# Patient Record
Sex: MALE | Race: WHITE
[De-identification: names, ages, dates, MRNs, and addresses within clinical notes are randomized per-mention and may not be internally consistent; named-entity substitution may affect disease eponyms.]

---

## 2017-07-05 ENCOUNTER — MYAURORA ACCOUNT LINK (OUTPATIENT)
Dept: OTHER | Age: 57
End: 2017-07-05

## 2017-07-05 ENCOUNTER — PRIOR ORIGINAL RECORDS (OUTPATIENT)
Dept: OTHER | Age: 57
End: 2017-07-05

## 2017-11-18 ENCOUNTER — APPOINTMENT (OUTPATIENT)
Dept: CV DIAGNOSTICS | Facility: HOSPITAL | Age: 57
DRG: 234 | End: 2017-11-18
Attending: INTERNAL MEDICINE
Payer: COMMERCIAL

## 2017-11-18 ENCOUNTER — APPOINTMENT (OUTPATIENT)
Dept: GENERAL RADIOLOGY | Facility: HOSPITAL | Age: 57
DRG: 234 | End: 2017-11-18
Attending: THORACIC SURGERY (CARDIOTHORACIC VASCULAR SURGERY)
Payer: COMMERCIAL

## 2017-11-18 ENCOUNTER — ANESTHESIA (OUTPATIENT)
Dept: CARDIAC SURGERY | Facility: HOSPITAL | Age: 57
End: 2017-11-18

## 2017-11-18 ENCOUNTER — ANESTHESIA EVENT (OUTPATIENT)
Dept: CARDIAC SURGERY | Facility: HOSPITAL | Age: 57
End: 2017-11-18

## 2017-11-18 ENCOUNTER — HOSPITAL ENCOUNTER (INPATIENT)
Facility: HOSPITAL | Age: 57
LOS: 4 days | Discharge: HOME HEALTH | DRG: 234 | End: 2017-11-22
Attending: HOSPITALIST | Admitting: HOSPITALIST
Payer: COMMERCIAL

## 2017-11-18 ENCOUNTER — SURGERY (OUTPATIENT)
Age: 57
End: 2017-11-18

## 2017-11-18 ENCOUNTER — PRIOR ORIGINAL RECORDS (OUTPATIENT)
Dept: OTHER | Age: 57
End: 2017-11-18

## 2017-11-18 ENCOUNTER — APPOINTMENT (OUTPATIENT)
Dept: INTERVENTIONAL RADIOLOGY/VASCULAR | Facility: HOSPITAL | Age: 57
DRG: 234 | End: 2017-11-18
Attending: INTERNAL MEDICINE
Payer: COMMERCIAL

## 2017-11-18 PROBLEM — I25.10 CAD IN NATIVE ARTERY: Status: ACTIVE | Noted: 2017-11-18

## 2017-11-18 PROBLEM — Z95.1 S/P CABG (CORONARY ARTERY BYPASS GRAFT): Status: ACTIVE | Noted: 2017-11-18

## 2017-11-18 PROCEDURE — B24BZZ4 ULTRASONOGRAPHY OF HEART WITH AORTA, TRANSESOPHAGEAL: ICD-10-PCS | Performed by: ANESTHESIOLOGY

## 2017-11-18 PROCEDURE — 5A02210 ASSISTANCE WITH CARDIAC OUTPUT USING BALLOON PUMP, CONTINUOUS: ICD-10-PCS | Performed by: INTERNAL MEDICINE

## 2017-11-18 PROCEDURE — 4A023N7 MEASUREMENT OF CARDIAC SAMPLING AND PRESSURE, LEFT HEART, PERCUTANEOUS APPROACH: ICD-10-PCS | Performed by: INTERNAL MEDICINE

## 2017-11-18 PROCEDURE — 06BQ4ZZ EXCISION OF LEFT SAPHENOUS VEIN, PERCUTANEOUS ENDOSCOPIC APPROACH: ICD-10-PCS | Performed by: THORACIC SURGERY (CARDIOTHORACIC VASCULAR SURGERY)

## 2017-11-18 PROCEDURE — 30233R1 TRANSFUSION OF NONAUTOLOGOUS PLATELETS INTO PERIPHERAL VEIN, PERCUTANEOUS APPROACH: ICD-10-PCS | Performed by: THORACIC SURGERY (CARDIOTHORACIC VASCULAR SURGERY)

## 2017-11-18 PROCEDURE — 021109W BYPASS CORONARY ARTERY, TWO ARTERIES FROM AORTA WITH AUTOLOGOUS VENOUS TISSUE, OPEN APPROACH: ICD-10-PCS | Performed by: THORACIC SURGERY (CARDIOTHORACIC VASCULAR SURGERY)

## 2017-11-18 PROCEDURE — 02100Z9 BYPASS CORONARY ARTERY, ONE ARTERY FROM LEFT INTERNAL MAMMARY, OPEN APPROACH: ICD-10-PCS | Performed by: THORACIC SURGERY (CARDIOTHORACIC VASCULAR SURGERY)

## 2017-11-18 PROCEDURE — 93306 TTE W/DOPPLER COMPLETE: CPT | Performed by: INTERNAL MEDICINE

## 2017-11-18 PROCEDURE — B211YZZ FLUOROSCOPY OF MULTIPLE CORONARY ARTERIES USING OTHER CONTRAST: ICD-10-PCS | Performed by: INTERNAL MEDICINE

## 2017-11-18 PROCEDURE — 30233K1 TRANSFUSION OF NONAUTOLOGOUS FROZEN PLASMA INTO PERIPHERAL VEIN, PERCUTANEOUS APPROACH: ICD-10-PCS | Performed by: THORACIC SURGERY (CARDIOTHORACIC VASCULAR SURGERY)

## 2017-11-18 PROCEDURE — 5A1221Z PERFORMANCE OF CARDIAC OUTPUT, CONTINUOUS: ICD-10-PCS | Performed by: THORACIC SURGERY (CARDIOTHORACIC VASCULAR SURGERY)

## 2017-11-18 PROCEDURE — B215YZZ FLUOROSCOPY OF LEFT HEART USING OTHER CONTRAST: ICD-10-PCS | Performed by: INTERNAL MEDICINE

## 2017-11-18 PROCEDURE — 99223 1ST HOSP IP/OBS HIGH 75: CPT | Performed by: HOSPITALIST

## 2017-11-18 PROCEDURE — 71010 XR CHEST AP PORTABLE  (CPT=71010): CPT | Performed by: THORACIC SURGERY (CARDIOTHORACIC VASCULAR SURGERY)

## 2017-11-18 RX ORDER — MORPHINE SULFATE 4 MG/ML
INJECTION, SOLUTION INTRAMUSCULAR; INTRAVENOUS
Status: COMPLETED
Start: 2017-11-18 | End: 2017-11-18

## 2017-11-18 RX ORDER — DOBUTAMINE HYDROCHLORIDE 200 MG/100ML
INJECTION INTRAVENOUS CONTINUOUS PRN
Status: DISCONTINUED | OUTPATIENT
Start: 2017-11-18 | End: 2017-11-20

## 2017-11-18 RX ORDER — ACETAMINOPHEN 325 MG/1
650 TABLET ORAL EVERY 6 HOURS PRN
Status: DISCONTINUED | OUTPATIENT
Start: 2017-11-18 | End: 2017-11-18

## 2017-11-18 RX ORDER — ASPIRIN 300 MG
300 SUPPOSITORY, RECTAL RECTAL ONCE
Status: DISCONTINUED | OUTPATIENT
Start: 2017-11-18 | End: 2017-11-20

## 2017-11-18 RX ORDER — ONDANSETRON 2 MG/ML
4 INJECTION INTRAMUSCULAR; INTRAVENOUS EVERY 6 HOURS PRN
Status: DISCONTINUED | OUTPATIENT
Start: 2017-11-18 | End: 2017-11-22

## 2017-11-18 RX ORDER — TEMAZEPAM 15 MG/1
15 CAPSULE ORAL NIGHTLY PRN
Status: DISCONTINUED | OUTPATIENT
Start: 2017-11-18 | End: 2017-11-20

## 2017-11-18 RX ORDER — MIDAZOLAM HYDROCHLORIDE 1 MG/ML
1 INJECTION INTRAMUSCULAR; INTRAVENOUS EVERY 30 MIN PRN
Status: DISCONTINUED | OUTPATIENT
Start: 2017-11-18 | End: 2017-11-20

## 2017-11-18 RX ORDER — SODIUM CHLORIDE 9 MG/ML
INJECTION, SOLUTION INTRAVENOUS CONTINUOUS
Status: ACTIVE | OUTPATIENT
Start: 2017-11-18 | End: 2017-11-18

## 2017-11-18 RX ORDER — POLYETHYLENE GLYCOL 3350 17 G/17G
1 POWDER, FOR SOLUTION ORAL DAILY PRN
Status: DISCONTINUED | OUTPATIENT
Start: 2017-11-18 | End: 2017-11-22

## 2017-11-18 RX ORDER — ASPIRIN 325 MG
325 TABLET ORAL ONCE
Status: DISCONTINUED | OUTPATIENT
Start: 2017-11-18 | End: 2017-11-20

## 2017-11-18 RX ORDER — ASPIRIN 81 MG/1
324 TABLET, CHEWABLE ORAL ONCE
Status: DISCONTINUED | OUTPATIENT
Start: 2017-11-18 | End: 2017-11-18 | Stop reason: HOSPADM

## 2017-11-18 RX ORDER — ASPIRIN 325 MG
325 TABLET, DELAYED RELEASE (ENTERIC COATED) ORAL DAILY
Status: DISCONTINUED | OUTPATIENT
Start: 2017-11-19 | End: 2017-11-22

## 2017-11-18 RX ORDER — MIDAZOLAM HYDROCHLORIDE 1 MG/ML
INJECTION INTRAMUSCULAR; INTRAVENOUS
Status: COMPLETED
Start: 2017-11-18 | End: 2017-11-18

## 2017-11-18 RX ORDER — SODIUM CHLORIDE 9 MG/ML
INJECTION, SOLUTION INTRAVENOUS ONCE
Status: DISCONTINUED | OUTPATIENT
Start: 2017-11-18 | End: 2017-11-18

## 2017-11-18 RX ORDER — DEXTROSE AND SODIUM CHLORIDE 5; .45 G/100ML; G/100ML
INJECTION, SOLUTION INTRAVENOUS CONTINUOUS
Status: ACTIVE | OUTPATIENT
Start: 2017-11-18 | End: 2017-11-19

## 2017-11-18 RX ORDER — MORPHINE SULFATE 4 MG/ML
2 INJECTION, SOLUTION INTRAMUSCULAR; INTRAVENOUS
Status: DISCONTINUED | OUTPATIENT
Start: 2017-11-18 | End: 2017-11-22

## 2017-11-18 RX ORDER — NITROGLYCERIN 20 MG/100ML
INJECTION INTRAVENOUS CONTINUOUS
Status: DISCONTINUED | OUTPATIENT
Start: 2017-11-18 | End: 2017-11-20

## 2017-11-18 RX ORDER — MORPHINE SULFATE 4 MG/ML
8 INJECTION, SOLUTION INTRAMUSCULAR; INTRAVENOUS
Status: DISCONTINUED | OUTPATIENT
Start: 2017-11-18 | End: 2017-11-20

## 2017-11-18 RX ORDER — DOCUSATE SODIUM 100 MG/1
100 CAPSULE, LIQUID FILLED ORAL 2 TIMES DAILY
Status: DISCONTINUED | OUTPATIENT
Start: 2017-11-18 | End: 2017-11-22

## 2017-11-18 RX ORDER — HYDROCODONE BITARTRATE AND ACETAMINOPHEN 10; 325 MG/1; MG/1
2 TABLET ORAL EVERY 4 HOURS PRN
Status: DISCONTINUED | OUTPATIENT
Start: 2017-11-18 | End: 2017-11-20

## 2017-11-18 RX ORDER — ATORVASTATIN CALCIUM 10 MG/1
10 TABLET, FILM COATED ORAL NIGHTLY
Status: DISCONTINUED | OUTPATIENT
Start: 2017-11-18 | End: 2017-11-18

## 2017-11-18 RX ORDER — DEXTROSE AND SODIUM CHLORIDE 5; .45 G/100ML; G/100ML
INJECTION, SOLUTION INTRAVENOUS CONTINUOUS
Status: ACTIVE | OUTPATIENT
Start: 2017-11-18 | End: 2017-11-18

## 2017-11-18 RX ORDER — POTASSIUM CHLORIDE 14.9 MG/ML
20 INJECTION INTRAVENOUS AS NEEDED
Status: DISCONTINUED | OUTPATIENT
Start: 2017-11-18 | End: 2017-11-20

## 2017-11-18 RX ORDER — FAMOTIDINE 10 MG/ML
20 INJECTION, SOLUTION INTRAVENOUS 2 TIMES DAILY
Status: DISCONTINUED | OUTPATIENT
Start: 2017-11-18 | End: 2017-11-20

## 2017-11-18 RX ORDER — CHLORHEXIDINE GLUCONATE 0.12 MG/ML
15 RINSE ORAL
Status: DISCONTINUED | OUTPATIENT
Start: 2017-11-18 | End: 2017-11-19

## 2017-11-18 RX ORDER — ASPIRIN 300 MG
300 SUPPOSITORY, RECTAL RECTAL DAILY
Status: DISCONTINUED | OUTPATIENT
Start: 2017-11-19 | End: 2017-11-20

## 2017-11-18 RX ORDER — IPRATROPIUM BROMIDE AND ALBUTEROL SULFATE 2.5; .5 MG/3ML; MG/3ML
3 SOLUTION RESPIRATORY (INHALATION) EVERY 4 HOURS
Status: DISCONTINUED | OUTPATIENT
Start: 2017-11-18 | End: 2017-11-19

## 2017-11-18 RX ORDER — LIDOCAINE HYDROCHLORIDE 10 MG/ML
INJECTION, SOLUTION INFILTRATION; PERINEURAL
Status: COMPLETED
Start: 2017-11-18 | End: 2017-11-18

## 2017-11-18 RX ORDER — ASPIRIN 325 MG
325 TABLET ORAL DAILY
Status: DISCONTINUED | OUTPATIENT
Start: 2017-11-18 | End: 2017-11-20

## 2017-11-18 RX ORDER — ATORVASTATIN CALCIUM 40 MG/1
40 TABLET, FILM COATED ORAL DAILY
Status: DISCONTINUED | OUTPATIENT
Start: 2017-11-18 | End: 2017-11-22

## 2017-11-18 RX ORDER — HEPARIN SODIUM AND DEXTROSE 10000; 5 [USP'U]/100ML; G/100ML
1000 INJECTION INTRAVENOUS ONCE
Status: COMPLETED | OUTPATIENT
Start: 2017-11-18 | End: 2017-11-18

## 2017-11-18 RX ORDER — ONDANSETRON 2 MG/ML
4 INJECTION INTRAMUSCULAR; INTRAVENOUS EVERY 6 HOURS PRN
Status: DISCONTINUED | OUTPATIENT
Start: 2017-11-18 | End: 2017-11-20

## 2017-11-18 RX ORDER — FAMOTIDINE 20 MG/1
20 TABLET ORAL 2 TIMES DAILY
Status: DISCONTINUED | OUTPATIENT
Start: 2017-11-18 | End: 2017-11-22

## 2017-11-18 RX ORDER — HYDROCODONE BITARTRATE AND ACETAMINOPHEN 10; 325 MG/1; MG/1
1 TABLET ORAL EVERY 4 HOURS PRN
Status: DISCONTINUED | OUTPATIENT
Start: 2017-11-18 | End: 2017-11-20

## 2017-11-18 RX ORDER — METOPROLOL SUCCINATE 50 MG/1
50 TABLET, EXTENDED RELEASE ORAL
Status: DISCONTINUED | OUTPATIENT
Start: 2017-11-18 | End: 2017-11-19

## 2017-11-18 RX ORDER — TEMAZEPAM 15 MG/1
15 CAPSULE ORAL NIGHTLY PRN
Status: DISCONTINUED | OUTPATIENT
Start: 2017-11-18 | End: 2017-11-18

## 2017-11-18 RX ORDER — NITROGLYCERIN 20 MG/100ML
INJECTION INTRAVENOUS CONTINUOUS PRN
Status: DISCONTINUED | OUTPATIENT
Start: 2017-11-18 | End: 2017-11-20

## 2017-11-18 RX ORDER — HEPARIN SODIUM 5000 [USP'U]/ML
INJECTION, SOLUTION INTRAVENOUS; SUBCUTANEOUS
Status: COMPLETED
Start: 2017-11-18 | End: 2017-11-18

## 2017-11-18 RX ORDER — SODIUM CHLORIDE 9 MG/ML
INJECTION, SOLUTION INTRAVENOUS CONTINUOUS
Status: DISCONTINUED | OUTPATIENT
Start: 2017-11-18 | End: 2017-11-20

## 2017-11-18 RX ORDER — POTASSIUM CHLORIDE 29.8 MG/ML
40 INJECTION INTRAVENOUS AS NEEDED
Status: DISCONTINUED | OUTPATIENT
Start: 2017-11-18 | End: 2017-11-21

## 2017-11-18 RX ORDER — DEXTROSE MONOHYDRATE 25 G/50ML
50 INJECTION, SOLUTION INTRAVENOUS
Status: DISCONTINUED | OUTPATIENT
Start: 2017-11-18 | End: 2017-11-20

## 2017-11-18 RX ORDER — MORPHINE SULFATE 4 MG/ML
2 INJECTION, SOLUTION INTRAMUSCULAR; INTRAVENOUS
Status: DISCONTINUED | OUTPATIENT
Start: 2017-11-18 | End: 2017-11-18

## 2017-11-18 RX ORDER — ALBUMIN, HUMAN INJ 5% 5 %
250 SOLUTION INTRAVENOUS ONCE AS NEEDED
Status: COMPLETED | OUTPATIENT
Start: 2017-11-18 | End: 2017-11-18

## 2017-11-18 RX ORDER — MORPHINE SULFATE 4 MG/ML
4 INJECTION, SOLUTION INTRAMUSCULAR; INTRAVENOUS
Status: DISCONTINUED | OUTPATIENT
Start: 2017-11-18 | End: 2017-11-22

## 2017-11-18 RX ORDER — SODIUM CHLORIDE 9 MG/ML
INJECTION, SOLUTION INTRAVENOUS CONTINUOUS
Status: DISCONTINUED | OUTPATIENT
Start: 2017-11-18 | End: 2017-11-18

## 2017-11-18 RX ORDER — DEXMEDETOMIDINE HYDROCHLORIDE 4 UG/ML
INJECTION, SOLUTION INTRAVENOUS CONTINUOUS
Status: DISCONTINUED | OUTPATIENT
Start: 2017-11-18 | End: 2017-11-20

## 2017-11-18 RX ORDER — ACETAMINOPHEN 325 MG/1
650 TABLET ORAL EVERY 6 HOURS PRN
Status: DISCONTINUED | OUTPATIENT
Start: 2017-11-18 | End: 2017-11-20

## 2017-11-18 RX ORDER — CEFAZOLIN SODIUM 1 G/3ML
INJECTION, POWDER, FOR SOLUTION INTRAMUSCULAR; INTRAVENOUS
Status: DISCONTINUED | OUTPATIENT
Start: 2017-11-18 | End: 2017-11-20

## 2017-11-18 RX ORDER — CEFAZOLIN SODIUM 1 G/3ML
INJECTION, POWDER, FOR SOLUTION INTRAMUSCULAR; INTRAVENOUS
Status: DISCONTINUED | OUTPATIENT
Start: 2017-11-18 | End: 2017-11-18 | Stop reason: HOSPADM

## 2017-11-18 RX ORDER — LISINOPRIL 5 MG/1
5 TABLET ORAL DAILY
Status: DISCONTINUED | OUTPATIENT
Start: 2017-11-18 | End: 2017-11-21

## 2017-11-18 RX ORDER — BISACODYL 10 MG
10 SUPPOSITORY, RECTAL RECTAL
Status: DISCONTINUED | OUTPATIENT
Start: 2017-11-18 | End: 2017-11-22

## 2017-11-18 RX ORDER — HEPARIN SODIUM AND DEXTROSE 10000; 5 [USP'U]/100ML; G/100ML
INJECTION INTRAVENOUS CONTINUOUS
Status: DISCONTINUED | OUTPATIENT
Start: 2017-11-18 | End: 2017-11-20

## 2017-11-18 NOTE — HISTORICAL OFFICE NOTE
Nguyen Perry  : 1960  ACCOUNT:  [id number]  [phone]  PCP: Dr. Max Resendiz     TODAY'S DATE: 2017  DICTATED BY:  Magdiel Roca M.D.]    CHIEF COMPLAINT: [Followup of .  CAD, of native vessels, Followup of Hyperlipidemia, Mixed, needs labs. I have given him a prescription for this. We talked about the importance of exercise therapy and diet. The patient understands this. If his labs are okay, I will see him back in a year.       PRESCRIPTIONS:   07/11/17 *Metoprolol Succinate 5

## 2017-11-18 NOTE — SIGNIFICANT EVENT
Received patient from [facility]. He is alert and oriented with heparin drip on. Needs attended.

## 2017-11-18 NOTE — ANESTHESIA PREPROCEDURE EVALUATION
PRE-OP EVALUATION    Patient Name: Rich Miles    Pre-op Diagnosis: chest pain s/p cardiac catheterization    Procedure(s):  Dr. Isabelle Benítez to notify PA & perfusionist. MD wants it at 0830  CV RNs/team and cardiac anesthesia notified by [facility]      Surgeon(s) a INITIAL DOSE 1,000 Units/hr Intravenous Once   heparin (PORCINE) drip 73599iejkm/250mL infusion CONTINUOUS 200-3,000 Units/hr Intravenous Continuous   Pantoprazole Sodium (PROTONIX) 40 mg in Sodium Chloride 0.9 % 10 mL IV push 40 mg Intravenous Q24H   nitr inserted  12/30/2013: COLONOSCOPY      Comment: Dr. Jolly Ghosh  4/22/16: HERNIA SURGERY      Comment: Laparoscopic bilateral inguinal hernia repair                with mesh and umbilical hernia repair  No date: IR STENT      Comment: cardiac  No date: PERIOD

## 2017-11-18 NOTE — CONSULTS
[facility]    Report of Consultation    Oneda Comp Patient Status:  Observation    1960 MRN [id number]   [facility] 2NE-A Attending Corby Perdue MD   [address] Day # 0 PCP Jamar Manzano DO     Date of Admission:  2017 His repeat EKG shows prior inferior infarct. Previously noted inverted T-wave is now upright. He denies shortness of breath. He does have some chronic shoulder problems. Stents were initially placed in the circumflex in 2002 and then the right in 2003. lisinopril (PRINIVIL,ZESTRIL) 10 MG Oral Tab Take 5 mg by mouth daily.          Allergies        No Known Allergies    Review of Systems:   Shoulder pain with chest pain and an episode of syncope otherwise  A comprehensive review of systems was negative e

## 2017-11-18 NOTE — ANESTHESIA POSTPROCEDURE EVALUATION
[facility] Patient Status:  Inpatient   Age/Gender 62year old male MRN [id number]   [facility] 6NE-A Attending Satnam Torres MD   [address] Day # 0 PCP Edis Car, DO       Anesthesia Post-op Note    Procedure(s):  c

## 2017-11-18 NOTE — H&P
[facility]  History and Physical     Golden Lam Patient Status:  Inpatient    1960 MRN [id number]   [facility] 6NE-A Attending Hebert Hines MD   [facility] Day # 0 PCP Jono Gates DO     Chief Complaint: chest pain Tab Take 1 tablet by mouth every 6 (six) hours as needed for Pain. Disp: 30 tablet Rfl: 0   HYDROcodone-acetaminophen (NORCO) 5-325 MG Oral Tab Take 1 tablet by mouth every 6 (six) hours as needed for Pain.  Disp: 30 tablet Rfl: 0   aspirin 325 MG Oral Tab (Patient's most recent lab result is older than the maximum 7 days allowed. ). No results for input(s): PTP, INR in the last 72 hours. No results for input(s): TROP, CK in the last 72 hours. Imaging: Imaging data reviewed in Epic.       ASSESSMENT /

## 2017-11-18 NOTE — PROGRESS NOTES
Cardiology Cardiac Cath Note        11/18/2017   4:15 AM          Procedure Performed: LHC, COR, LV and IABP          Indication: NSTEMI & CCS 4 angina          Cardiologist: Kalpesh Feliciano.  Darío Fairchild MD, [facility]          Conscious Sedation Given:  yes     see consc

## 2017-11-18 NOTE — CONSULTS
[facility] Consult    Dr Julio C Weinstein  PCP: [place]     63 yo WM with h/o CAD taken to [facility] due to syncopal episode while at his [employer] play  Troponin slightly elevated, pt also had CP and diaphoresis  Transferred to [facility] for further care     Cardiac cath [facility]

## 2017-11-18 NOTE — PROGRESS NOTES
CV Surgery Progress Note  Pt seen and examined by Dr. Alejandra Gonzalez. Cath films reviewed. Pt is NPO. Plan for CABG this am with Dr Ankur Ruvalcaba.    D/W Dr Yoav Gipson RN

## 2017-11-19 ENCOUNTER — APPOINTMENT (OUTPATIENT)
Dept: GENERAL RADIOLOGY | Facility: HOSPITAL | Age: 57
DRG: 234 | End: 2017-11-19
Attending: THORACIC SURGERY (CARDIOTHORACIC VASCULAR SURGERY)
Payer: COMMERCIAL

## 2017-11-19 PROCEDURE — 71010 XR CHEST AP PORTABLE  (CPT=71010): CPT | Performed by: THORACIC SURGERY (CARDIOTHORACIC VASCULAR SURGERY)

## 2017-11-19 PROCEDURE — 30233N1 TRANSFUSION OF NONAUTOLOGOUS RED BLOOD CELLS INTO PERIPHERAL VEIN, PERCUTANEOUS APPROACH: ICD-10-PCS | Performed by: HOSPITALIST

## 2017-11-19 PROCEDURE — 99232 SBSQ HOSP IP/OBS MODERATE 35: CPT | Performed by: HOSPITALIST

## 2017-11-19 RX ORDER — DEXTROSE MONOHYDRATE 25 G/50ML
50 INJECTION, SOLUTION INTRAVENOUS
Status: DISCONTINUED | OUTPATIENT
Start: 2017-11-19 | End: 2017-11-22

## 2017-11-19 RX ORDER — IPRATROPIUM BROMIDE AND ALBUTEROL SULFATE 2.5; .5 MG/3ML; MG/3ML
3 SOLUTION RESPIRATORY (INHALATION) EVERY 4 HOURS PRN
Status: DISCONTINUED | OUTPATIENT
Start: 2017-11-19 | End: 2017-11-22

## 2017-11-19 RX ORDER — FUROSEMIDE 10 MG/ML
20 INJECTION INTRAMUSCULAR; INTRAVENOUS
Status: DISPENSED | OUTPATIENT
Start: 2017-11-19 | End: 2017-11-22

## 2017-11-19 RX ORDER — ALBUMIN, HUMAN INJ 5% 5 %
250 SOLUTION INTRAVENOUS ONCE
Status: COMPLETED | OUTPATIENT
Start: 2017-11-19 | End: 2017-11-19

## 2017-11-19 NOTE — OCCUPATIONAL THERAPY NOTE
OCCUPATIONAL THERAPY EVALUATION - INPATIENT     Room Number: 0604/0090-F  Evaluation Date: 11/19/2017  Type of Evaluation: Initial  Presenting Problem: s/p CABG    Physician Order: IP Consult to Occupational Therapy  Reason for Therapy: ADL/IADL Dysfunctio his independence      OBJECTIVE  Precautions: Sternal;Cardiac  Fall Risk: High fall risk    WEIGHT BEARING RESTRICTION                   PAIN ASSESSMENT  Ratin  Location: chest  Management Techniques: Activity promotion; Body mechanics;Breathing techniq grooming such as brushing teeth?: A Little  -   Eating meals?: A Little    AM-PAC Score:  Score: 15  Approx Degree of Impairment: 56.46%  Standardized Score (AM-PAC Scale): 34.69  CMS Modifier (G-Code): CK    FUNCTIONAL TRANSFER ASSESSMENT  Supine to Sit : therapy during this acute care stay with increased rest home would be appropriate.  In this OT evaluation patient presents with the following performance deficits: standing balance, activity tolerance, attention to task, decreased knowledge of compensatory supervision    UE Exercise Program Goal  Patient will be supervision with bilateral AROM HEP (home exercise program).     Additional Goals:  Pt will stand at sink level to complete simple grooming tasks  Pt will verbalize 3/3 sternal precautions   Pt will v

## 2017-11-19 NOTE — PLAN OF CARE
Received from OR intubated sedated, accompanied by Dr. Lily Sepulveda. IABP 1:3 . Insulin dobutamine levophed precedex gtts infusing. Simpson Right radial and left femoral . Central line access in left groin. See flow sheet for ongoing assessments.  Chest tube managem

## 2017-11-19 NOTE — CONSULTS
[facility] Consult Note:  Medication List Evaluation for Delirium    Cata Muse is a 62year old male admitted 11/18/17 who has tested positive for delirium per RN evaluation.   Pharmacy has been consulted to evaluate his current medications for tho

## 2017-11-19 NOTE — PROCEDURES
[facility]    PATIENT'S NAME: Shilpi Stern   ATTENDING PHYSICIAN: Cristopher Dc M.D. OPERATING PHYSICIAN: Parris Agustin M.D.    PATIENT ACCOUNT#:   [de-identified]    LOCATION:  [address] [place] [address]  MEDICAL RECORD #:   [id number]       DATE OF BIRTH: proximal lesion. Ramus intermedius branch exhibits 80% discrete lesion. There is a distal stent segment that is patent with mild in-stent restenosis. d. Right coronary artery:  100% occluded with bridging collaterals present.   Faint flow is noted into Patient was transferred to a monitored unit for further management in the CCU. The surgical service will see the patient straightaway this morning. Further recommendations are dependent upon counseling and discussion.       Conscious sedation was provided

## 2017-11-19 NOTE — PLAN OF CARE
IABP removed with manual pressure to left groin CMS good to extremities Hemostasis maintained. . Plans to keep sedated overnight updated Dr. Meli Carrillo will get ABG change of  Shift.

## 2017-11-19 NOTE — PLAN OF CARE
See flow sheet for ongoing assessments chest tube continued frequent monitoring and frequent updates with Dr. Lilly Spurling. 1 Platelets 2 FFP. V/S stable making urine.

## 2017-11-19 NOTE — PROGRESS NOTES
[facility]  Progress Note     Francisco Nunez Patient Status:  Inpatient    1960 MRN [id number]   [facility] 6NE-A Attending Charmaine Vazquez MD   [facility] Day # 1 PCP Mackenzie Kate DO     Chief Complaint: Chest pain    S: Latoya Slim TID AC   • [START ON 11/20/2017] Insulin Aspart Pen  1-25 Units Subcutaneous Once   • furosemide  20 mg Intravenous BID (Diuretic)   • aspirin  325 mg Oral Daily   • atorvastatin  40 mg Oral Daily   • lisinopril  5 mg Oral Daily   • pantoprazole (PROTONIX)

## 2017-11-19 NOTE — PLAN OF CARE
Denies chest pain. CV surgery here to discuss care plan with family and patient for CABG today. See pre op orders. Obtained consent . To OR Accompanied by RN with monitor and nitro gtt. CV surgery binder given to family.

## 2017-11-19 NOTE — PHYSICAL THERAPY NOTE
PHYSICAL THERAPY EVALUATION - INPATIENT     Room Number: 6657/8517-K  Evaluation Date: 11/19/2017  Type of Evaluation: Initial  Physician Order: PT Eval and Treat    Presenting Problem: s/p cardiac cath and CABG x 3 11/18/17  Reason for Therapy: [facility] Sternal;Cardiac  Fall Risk: High fall risk    WEIGHT BEARING RESTRICTION                   PAIN ASSESSMENT  Ratin  Location: chest incisional site  Management Techniques: Activity promotion; Body mechanics;Breathing techniques;Repositioning    COGNITION A)  Distance (ft): 20  Assistive Device: Rolling walker  Pattern: Shuffle  Stoop/Curb Assistance: Not tested       Skilled Therapy Provided: Patient presents seated in bedside chair. Pt completes sit<>stand c Min A and cues for safe hand/foot placement.  Pt mobility. Based on this evaluation, patient's clinical presentation is evolving and overall the evaluation complexity is considered moderate.   These impairments and comorbidities manifest themselves as functional limitations in independent bed mobility,

## 2017-11-19 NOTE — PROGRESS NOTES
[facility]  Cardiology Progress Note    Mitch Stairs Patient Status:  Inpatient    1960 MRN [id number]   [facility] 6NE-A Attending Roselia Brown MD   [facility] Day # 1 PCP Sharlene Wilcox DO       Subjective: Extubated this morn K  4.4  4.1  4.2   CL  107  110  112*   CO2  22.0  24.0  25.0   BUN  19  18  18   CREATSERUM  0.99  1.14  1.11   CA  9.0  7.8*  7.4*   MG   --   2.4  1.7   GLU  132*  112*  136*       No results for input(s): ALT, AST, ALB, AMYLASE, LIPASE, LDH in the la injection 2 mg 2 mg Intravenous Q1H PRN   Or      morphINE sulfate (PF) 4 MG/ML injection 4 mg 4 mg Intravenous Q1H PRN   Or      morphINE sulfate (PF) 4 MG/ML injection 8 mg 8 mg Intravenous Q1H PRN   DOBUTamine in D5W (DOBUTREX) 500 mg/250 ml infusion 2. CeFAZolin Sodium (ANCEF/KEFZOL) IVPB premix 2 g 2 g Intravenous Q8H   Insulin Regular Human (NOVOLIN R) 100 Units in sodium chloride 0.9 % 100 mL infusion 1-57 Units/hr Intravenous Continuous   glucose (DEX4) oral liquid 15 g 15 g Oral Q15 Min PRN   Or

## 2017-11-19 NOTE — DIETARY NOTE
Nutrition Short Note    Dietitian consult received per cardiac rehab/CHF protocol. Pt to be educated by cardiac rehab staff and encouraged to attend outpatient classes taught by RD. RD available PRN.     Susu Ayon MS, RD, LDN

## 2017-11-19 NOTE — PLAN OF CARE
Pt received at [facility] intubated and sedated on full vent support, opens eyes and follows simple commands. NDIAYE. Pt nodding head appropriately to simple questions. Pt right groin IABP site CDI, no bleeding or hematoma present. Pressure dressing intact.  Pt left

## 2017-11-19 NOTE — CONSULTS
[facility]      Endocrinology Consultation    Clover Arzola Patient Status:  Inpatient    1960 MRN [id number]   [facility] 6NE-A Attending Elizabeth Iyer MD   [facility] Day # 1 PCP Taz De Los Santos DO     Reason for Consultation:  S Past Surgical History:  2002: CATH DRUG ELUTING STENT      Comment: 2 stents inserted  2004: CATH DRUG ELUTING STENT      Comment: 2 additional stents inserted  12/30/2013: COLONOSCOPY      Comment: Dr. Rozina Nieves  4/22/16: HERNIA SURGERY      Comment: L [id number]/250mL infusion CONTINUOUS, 200-3,000 Units/hr, Intravenous, Continuous  •  Pantoprazole Sodium (PROTONIX) 40 mg in Sodium Chloride 0.9 % 10 mL IV push, 40 mg, Intravenous, Q24H  •  nitroGLYCERIN infusion 50mg in D5W 250ml, 5-400 mcg/min, Intrave mg, Oral, 2x Daily(Beta Blocker)  •  docusate sodium (COLACE) cap 100 mg, 100 mg, Oral, BID **OR** docusate sodium (COLACE) liquid 100 mg, 100 mg, Oral, BID  •  magnesium hydroxide (MILK OF MAGNESIA) 400 MG/5ML suspension 10 mL, 10 mL, Oral, BID PRN  •  PE BMI 32.25 kg/m²   General: no apparent distress, appears stated age  Eyes: no proptosis, lid lag  ENT: oxygen by NC  Neck:  no thyromegaly   Lymph: no anterior cervical or supraclavicular lymphadenopathy  CV: + chest tube  Resp: clear to auscultation bilat

## 2017-11-19 NOTE — PROGRESS NOTES
[facility]   [facility] Progress Note    Rich Miles Patient Status:  Inpatient    1960 MRN [id number]   [facility] 6NE-A Attending Aspen Garibay MD   [facility] Day # 1 PCP Kaila Moreno DO     Subjective:  Pt seen and examined.  Re acetaminophen (TYLENOL) tab 650 mg 650 mg Oral Q6H PRN   [MAR Hold] temazepam (RESTORIL) cap 15 mg 15 mg Oral Nightly PRN   [MAR Hold] magnesium hydroxide (MILK OF MAGNESIA) 400 MG/5ML suspension 30 mL 30 mL Oral Daily PRN   [MAR Hold] ondansetron HCl (ZOF mg 300 mg Rectal Once   Or      aspirin tab 325 mg 325 mg Per NG Tube Once   aspirin EC EC tab 325 mg 325 mg Oral Daily   Or      aspirin 300 MG rectal suppository 300 mg 300 mg Rectal Daily   metoprolol Tartrate (LOPRESSOR) tab 25 mg 25 mg Oral 2x Daily(B Once       Labs:    Lab Results  Component Value Date   WBC 8.8 11/19/2017   HGB 9.0 11/19/2017   HCT 26.2 11/19/2017   .0 11/19/2017   CREATSERUM 1.11 11/19/2017   BUN 18 11/19/2017    11/19/2017   K 4.2 11/19/2017    11/19/2017   CO2 2

## 2017-11-20 ENCOUNTER — APPOINTMENT (OUTPATIENT)
Dept: GENERAL RADIOLOGY | Facility: HOSPITAL | Age: 57
DRG: 234 | End: 2017-11-20
Attending: THORACIC SURGERY (CARDIOTHORACIC VASCULAR SURGERY)
Payer: COMMERCIAL

## 2017-11-20 ENCOUNTER — TELEPHONE (OUTPATIENT)
Dept: FAMILY MEDICINE CLINIC | Facility: CLINIC | Age: 57
End: 2017-11-20

## 2017-11-20 ENCOUNTER — APPOINTMENT (OUTPATIENT)
Dept: GENERAL RADIOLOGY | Facility: HOSPITAL | Age: 57
DRG: 234 | End: 2017-11-20
Attending: PHYSICIAN ASSISTANT
Payer: COMMERCIAL

## 2017-11-20 PROCEDURE — 71010 XR CHEST AP PORTABLE  (CPT=71010): CPT | Performed by: THORACIC SURGERY (CARDIOTHORACIC VASCULAR SURGERY)

## 2017-11-20 PROCEDURE — 05H633Z INSERTION OF INFUSION DEVICE INTO LEFT SUBCLAVIAN VEIN, PERCUTANEOUS APPROACH: ICD-10-PCS | Performed by: HOSPITALIST

## 2017-11-20 PROCEDURE — 99232 SBSQ HOSP IP/OBS MODERATE 35: CPT | Performed by: HOSPITALIST

## 2017-11-20 PROCEDURE — B547ZZA ULTRASONOGRAPHY OF LEFT SUBCLAVIAN VEIN, GUIDANCE: ICD-10-PCS | Performed by: HOSPITALIST

## 2017-11-20 RX ORDER — SODIUM CHLORIDE 0.9 % (FLUSH) 0.9 %
10 SYRINGE (ML) INJECTION AS NEEDED
Status: CANCELLED | OUTPATIENT
Start: 2017-11-20

## 2017-11-20 RX ORDER — SODIUM CHLORIDE 0.9 % (FLUSH) 0.9 %
20 SYRINGE (ML) INJECTION AS NEEDED
Status: CANCELLED | OUTPATIENT
Start: 2017-11-20

## 2017-11-20 RX ORDER — LIDOCAINE HYDROCHLORIDE 40 MG/ML
3 INJECTION, SOLUTION RETROBULBAR; TOPICAL
Status: DISCONTINUED | OUTPATIENT
Start: 2017-11-20 | End: 2017-11-20

## 2017-11-20 RX ORDER — ACETAMINOPHEN AND CODEINE PHOSPHATE 300; 30 MG/1; MG/1
1 TABLET ORAL EVERY 4 HOURS PRN
Status: DISCONTINUED | OUTPATIENT
Start: 2017-11-20 | End: 2017-11-22

## 2017-11-20 RX ORDER — LIDOCAINE HYDROCHLORIDE 40 MG/ML
3 INJECTION, SOLUTION RETROBULBAR; TOPICAL EVERY 8 HOURS PRN
Status: DISCONTINUED | OUTPATIENT
Start: 2017-11-20 | End: 2017-11-22

## 2017-11-20 RX ORDER — SODIUM CHLORIDE 0.9 % (FLUSH) 0.9 %
10 SYRINGE (ML) INJECTION EVERY 12 HOURS
Status: DISCONTINUED | OUTPATIENT
Start: 2017-11-20 | End: 2017-11-21

## 2017-11-20 RX ORDER — METHYLPREDNISOLONE SODIUM SUCCINATE 125 MG/2ML
500 INJECTION, POWDER, LYOPHILIZED, FOR SOLUTION INTRAMUSCULAR; INTRAVENOUS ONCE
Status: DISCONTINUED | OUTPATIENT
Start: 2017-11-20 | End: 2017-11-20 | Stop reason: CLARIF

## 2017-11-20 RX ORDER — SODIUM CHLORIDE 0.9 % (FLUSH) 0.9 %
10 SYRINGE (ML) INJECTION EVERY 12 HOURS
Status: DISCONTINUED | OUTPATIENT
Start: 2017-11-20 | End: 2017-11-22

## 2017-11-20 NOTE — TELEPHONE ENCOUNTER
unfortunately my practice is closed, otherwise I would, Dr. Sheila Mayorga is an excellent Doctor and will take good care of him

## 2017-11-20 NOTE — PHYSICAL THERAPY NOTE
PHYSICAL THERAPY TREATMENT NOTE - INPATIENT    Room Number: 4212/6629-G     Session: 1   Number of Visits to Meet Established Goals: 5     History related to current admission: 62 y.o male admitted 11/18/17 after c/o L shoulder pain and syncopal episode w Saturation with activity 94%  O2 Device: Nasal cannula  Liters of O2:  4  Shortness of breath    AM-PAC '6-Clicks' INPATIENT SHORT FORM - BASIC MOBILITY  How much difficulty does the patient currently have. ..  -   Turning over in bed (including adjusting b present    ASSESSMENT   Patient requires encouragement for mobility skills.   Patient with decreased awareness of precautions, impaired balance, endurance and force generating capacity as well as impaired respiratory capacity (frequent productive coughing d

## 2017-11-20 NOTE — PROGRESS NOTES
[facility]  Progress Note     Merdis Pop Patient Status:  Inpatient    1960 MRN [id number]   [facility] 6NE-A Attending Lisette Berumen MD   [facility] Day # 2 PCP Tosha Porras DO     Chief Complaint: Chest pain    S: Christina Hahn Imaging: Imaging data reviewed in Epic.     Medications:   • Insulin Aspart Pen  1-30 Units Subcutaneous TID CC and HS   • Normal Saline Flush  10 mL Intravenous Q12H   • methylPREDNISolone (Solu-MEDROL) IVPB  500 mg Intravenous Once   • Normal Saline F

## 2017-11-20 NOTE — PLAN OF CARE
GASTROINTESTINAL - ADULT    • Minimal or absence of nausea and vomiting Completed    • Maintains or returns to baseline bowel function Completed    • Maintains adequate nutritional intake (undernourished) Completed    • Achieves appropriate nutritional int independently since duron removal. Bladder scan showed 420mL. Dr. Domo Kasper at bedside orders received to give lasix now and scheduled dose at 1700. See flowsheet for urine output. I will continue to monitor the patient closely.

## 2017-11-20 NOTE — TELEPHONE ENCOUNTER
Patient had CABG x 3 yesterday at [facility]. Patient is being referred to you by the hospital staff.  Wife Mayco Bradford wants to know if you would take him as a patient. (advised that your practice is full with new patients) Wife wants to know if you would t

## 2017-11-20 NOTE — PLAN OF CARE
Assumed care of this patient at [address] [place] [address]. Patient is AOx4, neurologically intact, no deficits are noted. Saturating 95% on 3 liters NC, deep shallow breaths noted, and tachypneic with ambulation.  Lungs sound clear/diminished bilaterally, present non-productive

## 2017-11-20 NOTE — OPERATIVE REPORT
[facility]    PATIENT'S NAME: Shilpi Stern   ATTENDING PHYSICIAN: Cristopher Dc M.D.    OPERATING PHYSICIAN: Negrita Gibbs MD   PATIENT ACCOUNT#:   [id number]    LOCATION:  [address] [place] [address]  MEDICAL RECORD #:   [id number]       DATE OF BIRTH:  [birth year] bladder for drainage. Patient prepped and draped. Balloon pump had previously been placed. Leland-Aida was attempted to be placed. The right IJ could not be cannulated;  as such, common femoral arterial and venous lines were placed.   Sternotomy was perfo The patient's family was updated by me regarding the patient's condition and the conduct of the operation.     Dictated By Carmen Garrison MD  d: 11/18/2017 13:19:27  t: 11/20/2017 07:50:02  [facility] [id number]  BF/

## 2017-11-20 NOTE — CM/SW NOTE
11/20/17 1600   CM/SW Referral Data   Referral Source Physician   Reason for Referral Discharge planning;Protocol order set   Specify order set CABG   Informant Patient;Spouse   Patient Info   Patient's Mental Status Alert;Oriented   Patient's Home Envi

## 2017-11-20 NOTE — PROGRESS NOTES
[facility]  Endocrinology Progress Note    Verona Duong Patient Status:  Inpatient    1960 MRN [id number]   [facility] 6NE-A Attending Sheri Valencia MD   [address] Day # 2 PCP Keesha Shah DO     Subjective:  Feels OK.  Chest p  11/20/2017   CO2 22.0 11/20/2017    11/20/2017   CA 7.8 11/20/2017   PGLU 132 11/20/2017         Recent Labs   11/18/17  1330 11/18/17  1423 11/18/17  1544 11/18/17  1710 11/18/17  1808 11/18/17  1907 11/18/17  1955 11/18/17 2056 11/18/17

## 2017-11-20 NOTE — OCCUPATIONAL THERAPY NOTE
OCCUPATIONAL THERAPY TREATMENT NOTE - INPATIENT     Room Number: 2515/8644-K  Session: 1   Number of Visits to Meet Established Goals: 3    Presenting Problem: s/p CABG    History related to current admission: presented at [facility] ED after experiencing or urinal? : A Little  -   Putting on and taking off regular upper body clothing?: A Little  -   Taking care of personal grooming such as brushing teeth?: A Little  -   Eating meals?: None    AM-PAC Score:  Score: 18  Approx Degree of Impairment: 46.65%  S Recommendations: TBD    PLAN  OT Treatment Plan: Balance activities; Energy conservation/work simplification techniques;ADL training;IADL training;Functional transfer training;UE strengthening/ROM; Endurance training;Patient/Family education;Patient/Family t

## 2017-11-20 NOTE — PLAN OF CARE
[facility] Pharmacy Note: Antimicrobial Weight Dose Adjustment for: Rocephin (ceftriaxone)    Rocephin adjusted from 1 gm IV daily to 2 gm IV daily for UTI per protocol for weight > 100 kg.     Thank you,    Makayla Jason, PharmD  11/20/2017  9:15 AM       Rocephin

## 2017-11-20 NOTE — PROGRESS NOTES
[facility]  Progress Note    Khang Camacho Patient Status:  Inpatient    1960 MRN [id number]   [facility] 6NE-A Attending Mike Batista MD   [facility] Day # 2 PCP Indio Steve DO       Subjective:  Pain control an issue.  Not so atorvastatin  40 mg Oral Daily   • lisinopril  5 mg Oral Daily   • pantoprazole (PROTONIX) IV push  40 mg Intravenous Q24H   • aspirin  300 mg Rectal Once    Or   • aspirin  325 mg Per NG Tube Once   • aspirin EC  325 mg Oral Daily    Or   • aspirin  300 m

## 2017-11-20 NOTE — PROGRESS NOTES
[facility]  Progress Note    Khang Camacho Patient Status:  Inpatient    1960 MRN [id number]   [facility] 6NE-A Attending Mike Batista MD   [address] Day # 2 PCP Francisco Wang DO     Subjective:  Pt feeling better in terms of pain Temporal, resp. rate 20, height 5' 11\" (1.803 m), weight 234 lb 2.1 oz (106.2 kg), SpO2 96 %.   General: A&O X 3, VSS, NAD, afeb  Neck: no JVD  Lungs: CTAB  Heart: tachy, regular  Abdomen: soft, BS present  Extremities: trace edema LLE  Skin: warm/dry  Lenin Keanu

## 2017-11-20 NOTE — TELEPHONE ENCOUNTER
Spouse Lisy returned call. Advised Lisy that RN left her a voicemail on her phone. Advised that Dr Mary Sauer practice is full and he is recommending the patient see Dr Leigh Dwyer. Spouse verbalized understanding.  Confirmed patients appointment day and time wit

## 2017-11-21 ENCOUNTER — APPOINTMENT (OUTPATIENT)
Dept: GENERAL RADIOLOGY | Facility: HOSPITAL | Age: 57
DRG: 234 | End: 2017-11-21
Attending: PHYSICIAN ASSISTANT
Payer: COMMERCIAL

## 2017-11-21 ENCOUNTER — PRIOR ORIGINAL RECORDS (OUTPATIENT)
Dept: OTHER | Age: 57
End: 2017-11-21

## 2017-11-21 PROCEDURE — 71010 XR CHEST AP PORTABLE  (CPT=71010): CPT | Performed by: PHYSICIAN ASSISTANT

## 2017-11-21 PROCEDURE — 99233 SBSQ HOSP IP/OBS HIGH 50: CPT | Performed by: HOSPITALIST

## 2017-11-21 RX ORDER — AMIODARONE HYDROCHLORIDE 200 MG/1
400 TABLET ORAL 2 TIMES DAILY WITH MEALS
Status: DISCONTINUED | OUTPATIENT
Start: 2017-11-21 | End: 2017-11-22

## 2017-11-21 NOTE — PLAN OF CARE
Assumed care of Woodsoncandida Vargas at approximately 1915: awake, alert, oriented, pleasant, and cooperative with care, sitting up the chair with family at bedside. NSR on the monitor, lungs clear on 4L NC, voiding copious amounts of clear yellow urine.      Please see nuvia

## 2017-11-21 NOTE — CM/SW NOTE
Spoke with residential hhc liaison bryn--agency unable to provide hhc coverage in [place]--ECIN referral sent to Leah Mcdermott 48 await response    [facility]  Phone [phone]  Fax   [phone]

## 2017-11-21 NOTE — PLAN OF CARE
Received pt from CCU this morning. Pt POD #3 from CABG x3. Pt A&O, wife at bedside. VSS- on RA.  NSR on tele w/ PVCs. Midsternal incision w/ steri strips- small amount of drainage noted. CT sites w/ pressure dressing.  Pacer wires removed today per p

## 2017-11-21 NOTE — PROGRESS NOTES
[facility]  Endocrinology Progress Note    Nicolás Barrier Patient Status:  Inpatient    1960 MRN [id number]   [facility] 6NE-A Attending Rossy Thornton MD   [facility] Day # 3 PCP Chung De León DO     Subjective:  Feels well.  NO other 11/19/17 2051 11/20/17  0206 11/20/17  0803   PGLU 113* 138* 158* 156* 146* 131* 136* 130* 123* 115* 144* 135* 139* 142* 145* 134* 110* 127* 115* 128* 109* 131* 138* 132*        Ref.  Range 11/18/2017 05:56   HEMOGLOBIN A1c Latest Ref Range: <5.7 % 5.2

## 2017-11-21 NOTE — PAYOR COMM NOTE
--------------  ADMISSION REVIEW     Payor: [employer]  Subscriber #:  [id number]  Authorization Number: [id number]    Admit date: 11/18/17  Admit time: [address]       Admitting Physician: Taniya Morris MD  Attending Physician:  Niko Thomson MD  Primary Care repair  No date: IR STENT      Comment: cardiac  No date: PERIODONTAL SCALING & ROOT    Family History:   Family History   Problem Relation Age of Onset   • Heart Disease Father      Allergies: No Known Allergies    Medications:    No current facility-admi cyanosis. Integument: No rashes or lesions. Psychiatric: Appropriate mood and affect. Diagnostic Data:      Labs:  No results for input(s): WBC, HGB, MCV, PLT, BAND, INR in the last 72 hours.     Invalid input(s): LYM#, MONO#, BASOS#, EOSIN#    No res He was having intermittent chest pain, and as such, he was taken for catheterization on an emergency basis with Dr. Mauricio Tobar.   Catheterization demonstrated total occlusion of the right coronary artery with no distal visualization, which Dr. Mauricio Tobar felt was Cardioplegia was given following which the left internal mammary artery was placed to the LAD in the midportion of the LAD. The LAD was a good quality target as was the mammary.   Rewarming was begun while proximal anastomoses for the 2 vein grafts were co mg Intravenous Q30 Min PRN   0.9%  NaCl infusion   Intravenous Continuous   HYDROcodone-acetaminophen (NORCO)  MG per tab 1 tablet 1 tablet Oral Q4H PRN   Or         HYDROcodone-acetaminophen (NORCO)  MG per tab 2 tablet 2 tablet Oral Q4H PRN sodium chloride 0.9 % 500 mL IVPB 15 mg/kg Intravenous Q12H   mupirocin (BACTROBAN) 2% nasal ointment OINT 1 Application 1 Application Nasal BID   CeFAZolin Sodium (ANCEF/KEFZOL) IVPB premix 2 g 2 g Intravenous Q8H   Insulin Regular Human (NOVOLIN R) 100 U

## 2017-11-21 NOTE — PROGRESS NOTES
[facility]  Progress Note    Anabel Gustafson Patient Status:  Inpatient    1960 MRN [id number]   [facility] 6NE-A Attending Stevenson Darby MD   [facility] Day # 3 PCP Mustapha Pollack, DO       Subjective:  Feeling much better.  Still with docusate sodium  100 mg Oral BID    Or   • docusate sodium  100 mg Oral BID   • famoTIDine  20 mg Oral BID   • mupirocin  1 Application Nasal BID   • coagulation factor VIIa recomb  5 mg Intravenous Once     • norepinephrine Stopped (11/20/17 0900)       A

## 2017-11-21 NOTE — OCCUPATIONAL THERAPY NOTE
OCCUPATIONAL THERAPY TREATMENT NOTE - INPATIENT     Room Number: 0234/2685-N  Session: 2   Number of Visits to Meet Established Goals: 3    Presenting Problem: s/p CABG    History related to current admission: presented at [facility] ED after experiencing and taking off regular upper body clothing?: A Little  -   Taking care of personal grooming such as brushing teeth?: None  -   Eating meals?: None    AM-PAC Score:  Score: 19  Approx Degree of Impairment: 42.8%  Standardized Score (AM-PAC Scale): 40.22  CM techniques. Pt needs encouragement to progress mobility and be an active participant in self-care and func'l t/f's. No LOB noted during mobility, but cues to maintain eyes open and to incorporate breathing techniques required.   Additional time needed dur

## 2017-11-21 NOTE — PROGRESS NOTES
Assumed care of patient at 0730. VSS and neurologically intact. PT/OT ambulated patient. Received orders to transfer patient to [address] [place] [address]. Called report to [facility]. Awaiting transport. Will continue to monitor the patient closely.

## 2017-11-21 NOTE — CM/SW NOTE
[facility] cannot accept pt. CTU8 CM sending referral to [facility]. Await acceptance.   Gracia Rollins, 11/21/17, 3:15 PM

## 2017-11-21 NOTE — PROGRESS NOTES
[facility]  Progress Note     Gardner Socks Patient Status:  Inpatient    1960 MRN [id number]   [facility] 8NE-A Attending Niko Thomson MD   [facility] Day # 3 PCP Luis Armando Shaw DO     Chief Complaint: cp    S: Patient feels josephine • amiodarone HCl  400 mg Oral BID with meals   • Insulin Aspart Pen  1-30 Units Subcutaneous TID CC and HS   • Normal Saline Flush  10 mL Intravenous Q12H   • furosemide  20 mg Intravenous BID (Diuretic)   • atorvastatin  40 mg Oral Daily   • aspirin EC

## 2017-11-21 NOTE — PAYOR COMM NOTE
--------------  CONTINUED STAY REVIEW    Payor: [facility] PPO  Subscriber #:  [id number]  Authorization Number: [id number]    Admit date: 11/18/17  Admit time: [address]    Admitting Physician: Vannesa Cannon MD  Attending Physician:  Brooklyn Botello MD  Primary Car Lindsey Cooley RN      aspirin EC EC tab 325 mg     Date Action Dose Route User    11/21/2017 7737 Given 325 mg Oral Lindsey Cooley RN      atorvastatin (LIPITOR) tab 40 mg     Date Action Dose Route User    11/21/2017 0814 Given 40 mg Oral TIA'Charlie 11/20/17   0428  11/21/17   0446   WBC   --    < >  8.8  16.3*  16.5*  16.0*  12.8   HGB   --    < >  9.0*  8.4*  10.1*  8.9*  8.5*   MCV   --    < >  90.3  91.0  93.9  91.5  90.8   PLT  102.0*   < >  119.0*  142.0*  101.0*  124.0*  123.0*   INR  1.44*   -

## 2017-11-21 NOTE — PROGRESS NOTES
[facility]  CV Surgery Progress Note    Meliza Ruano Patient Status:  Inpatient    1960 MRN [id number]   [facility] 6NE-A Attending Enzo Chiang MD   [facility] Day # 3 PCP Sherly Calixto,      Subjective:  Doing well.  Short run of

## 2017-11-22 VITALS
RESPIRATION RATE: 17 BRPM | HEART RATE: 79 BPM | SYSTOLIC BLOOD PRESSURE: 114 MMHG | HEIGHT: 71 IN | DIASTOLIC BLOOD PRESSURE: 80 MMHG | WEIGHT: 226.44 LBS | TEMPERATURE: 98 F | BODY MASS INDEX: 31.7 KG/M2 | OXYGEN SATURATION: 100 %

## 2017-11-22 PROCEDURE — 99239 HOSP IP/OBS DSCHRG MGMT >30: CPT | Performed by: HOSPITALIST

## 2017-11-22 RX ORDER — AMIODARONE HYDROCHLORIDE 400 MG/1
400 TABLET ORAL DAILY
Qty: 5 TABLET | Refills: 0 | Status: SHIPPED | OUTPATIENT
Start: 2017-11-22 | End: 2017-11-22

## 2017-11-22 RX ORDER — METOPROLOL SUCCINATE 25 MG/1
25 TABLET, EXTENDED RELEASE ORAL ONCE
Status: COMPLETED | OUTPATIENT
Start: 2017-11-22 | End: 2017-11-22

## 2017-11-22 RX ORDER — METOPROLOL TARTRATE 50 MG/1
50 TABLET, FILM COATED ORAL
Status: DISCONTINUED | OUTPATIENT
Start: 2017-11-22 | End: 2017-11-22

## 2017-11-22 RX ORDER — AMIODARONE HYDROCHLORIDE 200 MG/1
200 TABLET ORAL DAILY
Qty: 30 TABLET | Refills: 3 | Status: SHIPPED | OUTPATIENT
Start: 2017-11-27 | End: 2018-01-29

## 2017-11-22 RX ORDER — FUROSEMIDE 10 MG/ML
20 INJECTION INTRAMUSCULAR; INTRAVENOUS ONCE
Status: COMPLETED | OUTPATIENT
Start: 2017-11-22 | End: 2017-11-22

## 2017-11-22 RX ORDER — ACETAMINOPHEN AND CODEINE PHOSPHATE 300; 30 MG/1; MG/1
TABLET ORAL
Qty: 50 TABLET | Refills: 0 | Status: SHIPPED | OUTPATIENT
Start: 2017-11-22 | End: 2020-07-14 | Stop reason: ALTCHOICE

## 2017-11-22 RX ORDER — AMIODARONE HYDROCHLORIDE 400 MG/1
400 TABLET ORAL DAILY
Qty: 5 TABLET | Refills: 0 | Status: SHIPPED | OUTPATIENT
Start: 2017-11-22 | End: 2017-11-27

## 2017-11-22 RX ORDER — FLUTICASONE PROPIONATE 50 MCG
1 SPRAY, SUSPENSION (ML) NASAL DAILY
Qty: 1 BOTTLE | Refills: 0 | Status: SHIPPED | OUTPATIENT
Start: 2017-11-23 | End: 2017-12-23

## 2017-11-22 RX ORDER — AMIODARONE HYDROCHLORIDE 200 MG/1
200 TABLET ORAL DAILY
Qty: 30 TABLET | Refills: 3 | Status: SHIPPED | OUTPATIENT
Start: 2017-11-27 | End: 2017-11-22

## 2017-11-22 RX ORDER — FLUTICASONE PROPIONATE 50 MCG
1 SPRAY, SUSPENSION (ML) NASAL DAILY
Status: DISCONTINUED | OUTPATIENT
Start: 2017-11-22 | End: 2017-11-22

## 2017-11-22 NOTE — PROGRESS NOTES
[facility]  Cardiology Progress Note    Subjective:  No chest pain or shortness of breath. Pain controlled. Reports no bowel movement yet. Main complaint is with his ongoing, chronic sinus issues.     Objective:  /75 (BP Location: Right arm) on statin    Plan:    - Up-titrate beta blocker  - Continue amiodarone 400mg PO BID while here, transition to Amiodarone 200mg PO daily after discharge. - Leave off ACE for now with low-normal b/p to allow up-titration of beta blocker.   Can resume as outp

## 2017-11-22 NOTE — PHYSICAL THERAPY NOTE
PHYSICAL THERAPY TREATMENT NOTE - INPATIENT    Room Number: 2153/5890-W     Session: 3  Number of Visits to Meet Established Goals: 5     History related to current admission: 62 y.o male admitted 11/18/17 after c/o L shoulder pain and syncopal episode wh have...  -   Turning over in bed (including adjusting bedclothes, sheets and blankets)?: None   -   Sitting down on and standing up from a chair with arms (e.g., wheelchair, bedside commode, etc.): None   -   Moving from lying on back to sitting on the maite Up in chair;Call light within reach; Needs met;RN aware of session/findings; All patient questions and concerns addressed    ASSESSMENT   The pt has now met all therapy goals as he is able to complete bed mobility, transfers, ambulation and stairs with modif

## 2017-11-22 NOTE — CARDIAC REHAB
All MI/CAD education completed with pt. He is scheduled to start cardiac rehab at [facility] in December.

## 2017-11-22 NOTE — OCCUPATIONAL THERAPY NOTE
OCCUPATIONAL THERAPY TREATMENT NOTE - INPATIENT     Room Number: 9308/0327-L  Session: 3   Number of Visits to Meet Established Goals: 3    Presenting Problem: s/p CABG    History related to current admission: presented at [facility] ED after experiencing on and taking off regular upper body clothing?: A Little  -   Taking care of personal grooming such as brushing teeth?: None  -   Eating meals?: None    AM-PAC Score:  Score: 20  Approx Degree of Impairment: 38.32%  Standardized Score (AM-PAC Scale): 42.03 stemi chest pain CAD in native artery. Pt continues to progress with both self-care and func'l mobility; however is limited d/t activity tolerance, pacing, upholding precautions, insight into deficits, and use of adaptive techniques.  Pt is at S level for

## 2017-11-22 NOTE — PLAN OF CARE
NURSING DISCHARGE NOTE      Pt discharged to home w/ wife in stable condition. Open heart discharge instructions provided and reviewed w/ pt. Prescriptions, medication list, and follow-up appointments provided and reviewed w/ pt.    Pt stated unders

## 2017-11-22 NOTE — PROGRESS NOTES
[facility]  CV Surgery Progress Note    Filiberto Álvarez Patient Status:  Inpatient    1960 MRN [id number]   [facility] 6NE-A Attending Mariana Montalvo MD   [facility] Day # 4 PCP Little Mcwilliams,      Subjective:  Doing well.  Remains in NS

## 2017-11-22 NOTE — PLAN OF CARE
Pt POD #4 from CABG. De-lined. Possible discharge today? Pt and wife hopeful for discharge today. Pt A&O, denies pain this am.  VSS- on RA. Encouraged frequent IS use. NSR on tele w/ PVCs- PO amio continues.    Midsternal incision w/ small amount of ser

## 2017-11-23 NOTE — DISCHARGE SUMMARY
[facility]  DISCHARGE SUMMARY     Uriel Dwyer Patient Status:  Inpatient    1960 MRN [id number]   [facility] 8NE-A Attending No att. providers found   Hosp Day # 4 PCP Letha Shaffer DO     Date of Admission: 2017  Claudio findings and recommendations (brief descriptions):   •     Lab/Test results pending at Discharge:   ·     Consultants:  • Dr Bony Paulson  [facility]    Discharge Medication List:     Discharge Medications      START taking these medications      Instructions Prescription Bring a paper prescription for each of these medications  · Acetaminophen-Codeine #3 300-30 MG Tabs         Follow-up appointment:   Owen Pete MD  [address]

## 2017-11-24 ENCOUNTER — PATIENT OUTREACH (OUTPATIENT)
Dept: CASE MANAGEMENT | Age: 57
End: 2017-11-24

## 2017-11-24 DIAGNOSIS — Z02.9 ENCOUNTERS FOR ADMINISTRATIVE PURPOSE: ICD-10-CM

## 2017-11-24 NOTE — PROGRESS NOTES
Initial Post Discharge Follow Up   Discharge Date: 11/22/17  Contact Date: 11/24/2017    Consent Verification:  Assessment Completed With: Patient  HIPAA Verified? Yes    Discharge Dx:  S/p CABG x3 on 11/18/17 with Dr. Adriel Cabrera.      General:   • How have yo daily. Takes 1/2 tab daily  Disp:  Rfl:    Atorvastatin Calcium (LIPITOR) 40 MG Oral Tab Take 40 mg by mouth daily. Disp:  Rfl:    Metoprolol Succinate ER (TOPROL XL) 50 MG Oral Take 50 mg by mouth daily.  Disp:  Rfl:    lisinopril (PRINIVIL,ZESTRIL) 10 MG adequately prepared as this was a planned procedure.     Appointments Scheduled:    PCP in one week:   yes   Cardiologist 2 weeks yes    Chest Xray 7 days post d/c yes      Needs post D/C:   Now that you are home, are there any needs or concerns you need ad Reviewed/scheduled/rescheduled PCP TCM/HFU appointment with pt:  Yes      Have you made all of your follow up appointments? yes    Is there any reason as to why you cannot make your appointments?    No     NCM Reviewed upcoming Specialist Appt with patient

## 2017-11-30 ENCOUNTER — HOSPITAL ENCOUNTER (OUTPATIENT)
Dept: GENERAL RADIOLOGY | Facility: HOSPITAL | Age: 57
Discharge: HOME | End: 2017-11-30
Attending: THORACIC SURGERY (CARDIOTHORACIC VASCULAR SURGERY)
Payer: COMMERCIAL

## 2017-11-30 ENCOUNTER — PRIOR ORIGINAL RECORDS (OUTPATIENT)
Dept: OTHER | Age: 57
End: 2017-11-30

## 2017-11-30 ENCOUNTER — OFFICE VISIT (OUTPATIENT)
Dept: FAMILY MEDICINE CLINIC | Facility: CLINIC | Age: 57
End: 2017-11-30

## 2017-11-30 VITALS
TEMPERATURE: 99 F | HEIGHT: 71 IN | RESPIRATION RATE: 16 BRPM | DIASTOLIC BLOOD PRESSURE: 60 MMHG | SYSTOLIC BLOOD PRESSURE: 100 MMHG | HEART RATE: 76 BPM

## 2017-11-30 DIAGNOSIS — Z95.1 S/P CABG (CORONARY ARTERY BYPASS GRAFT): ICD-10-CM

## 2017-11-30 DIAGNOSIS — I21.4 NSTEMI (NON-ST ELEVATED MYOCARDIAL INFARCTION) (HCC): Primary | ICD-10-CM

## 2017-11-30 DIAGNOSIS — R06.02 SOB (SHORTNESS OF BREATH): ICD-10-CM

## 2017-11-30 DIAGNOSIS — I10 ESSENTIAL HYPERTENSION: ICD-10-CM

## 2017-11-30 DIAGNOSIS — I25.10 CAD IN NATIVE ARTERY: ICD-10-CM

## 2017-11-30 LAB
CHOLESTEROL, TOTAL: 124 MG/DL
HDL CHOLESTEROL: 52 MG/DL
LDL CHOLESTEROL: 62 MG/DL
NON-HDL CHOLESTEROL: 72 MG/DL
TOTAL CHOLESTEROL / HDL RATIO: 2.38 RATIO UN
TRIGLYCERIDES: 52 MG/DL

## 2017-11-30 PROCEDURE — 99495 TRANSJ CARE MGMT MOD F2F 14D: CPT | Performed by: FAMILY MEDICINE

## 2017-11-30 PROCEDURE — 71020 XR CHEST PA + LAT CHEST (CPT=71020): CPT | Performed by: THORACIC SURGERY (CARDIOTHORACIC VASCULAR SURGERY)

## 2017-11-30 PROCEDURE — 71035 XR CHEST DECUBITUS (CPT=71035): CPT | Performed by: THORACIC SURGERY (CARDIOTHORACIC VASCULAR SURGERY)

## 2017-11-30 RX ORDER — LISINOPRIL 5 MG/1
TABLET ORAL
Refills: 2 | COMMUNITY
Start: 2017-11-08 | End: 2020-07-14 | Stop reason: ALTCHOICE

## 2017-11-30 NOTE — PROGRESS NOTES
Jesús Guevara is a 62year old male. Patient presents with:  Hospital F/U: from [facility] for CABG x 3 per pt    HPI:    Jesús Guevara is a 62year old male here today for hospital follow up.    He was discharged from Inpatient hospital, [address] [place] [address] Feels good. Chest wound hurts. Trouble sleeping at times. Wounds healing well. No fevers. Saw cardiology today: Dr. Tash Everett at [facility] heart. EKG looked good. No afib, some PVC's. Hernia, had surgery. Inguinal hernia surgery last spring. blurred vision or double vision  HEENT: denies nasal congestion, sinus pain or ST  LUNGS: denies shortness of breath with exertion  CARDIOVASCULAR: denies chest pain on exertion or palpitations  GI: denies abdominal pain, denies heartburn, denies diarrhea scheduled. Pt will be doing cardiac rehab here in the office.       Transitional Care Management Certification:  I certify that the following are true:  Communication with the patient was made within 2 business days of discharge on date above   [address]

## 2017-12-18 ENCOUNTER — HOSPITAL ENCOUNTER (OUTPATIENT)
Dept: CV DIAGNOSTICS | Facility: HOSPITAL | Age: 57
Discharge: HOME | End: 2017-12-18
Attending: INTERNAL MEDICINE
Payer: COMMERCIAL

## 2017-12-18 DIAGNOSIS — Z95.1 S/P CABG (CORONARY ARTERY BYPASS GRAFT): ICD-10-CM

## 2017-12-18 DIAGNOSIS — Z51.89 ENCOUNTER FOR REHABILITATION: ICD-10-CM

## 2017-12-18 DIAGNOSIS — I25.10 CAD (CORONARY ARTERY DISEASE): ICD-10-CM

## 2017-12-18 PROCEDURE — 93018 CV STRESS TEST I&R ONLY: CPT | Performed by: INTERNAL MEDICINE

## 2017-12-18 PROCEDURE — 93017 CV STRESS TEST TRACING ONLY: CPT | Performed by: INTERNAL MEDICINE

## 2017-12-19 ENCOUNTER — PRIOR ORIGINAL RECORDS (OUTPATIENT)
Dept: OTHER | Age: 57
End: 2017-12-19

## 2017-12-20 LAB
HEMATOCRIT: 24.7 %
HEMATOCRIT: 26 %
HEMOGLOBIN: 8.5 G/DL
HEMOGLOBIN: 8.9 G/DL
PLATELETS: 123 K/UL
PLATELETS: 124 K/UL
RED BLOOD COUNT: 2.72 X 10-6/U
RED BLOOD COUNT: 2.84 X 10-6/U
WHITE BLOOD COUNT: 12.8 X 10-3/U
WHITE BLOOD COUNT: 16 X 10-3/U

## 2017-12-26 ENCOUNTER — PRIOR ORIGINAL RECORDS (OUTPATIENT)
Dept: OTHER | Age: 57
End: 2017-12-26

## 2017-12-26 ENCOUNTER — CARDPULM VISIT (OUTPATIENT)
Dept: CARDIAC REHAB | Facility: HOSPITAL | Age: 57
End: 2017-12-26
Attending: INTERNAL MEDICINE

## 2017-12-26 PROCEDURE — 93798 PHYS/QHP OP CAR RHAB W/ECG: CPT

## 2017-12-27 ENCOUNTER — HOSPITAL ENCOUNTER (OUTPATIENT)
Dept: CV DIAGNOSTICS | Age: 57
Discharge: HOME | End: 2017-12-27
Attending: INTERNAL MEDICINE
Payer: COMMERCIAL

## 2017-12-27 ENCOUNTER — CARDPULM VISIT (OUTPATIENT)
Dept: CARDIAC REHAB | Age: 57
End: 2017-12-27
Attending: INTERNAL MEDICINE

## 2017-12-27 DIAGNOSIS — I48.91 ATRIAL FIBRILLATION, UNSPECIFIED TYPE (HCC): ICD-10-CM

## 2017-12-27 PROCEDURE — 93227 XTRNL ECG REC<48 HR R&I: CPT | Performed by: INTERNAL MEDICINE

## 2017-12-27 PROCEDURE — 93226 XTRNL ECG REC<48 HR SCAN A/R: CPT | Performed by: INTERNAL MEDICINE

## 2017-12-27 PROCEDURE — 93225 XTRNL ECG REC<48 HRS REC: CPT | Performed by: INTERNAL MEDICINE

## 2017-12-27 PROCEDURE — 93798 PHYS/QHP OP CAR RHAB W/ECG: CPT

## 2017-12-27 NOTE — CARDIAC REHAB
Patient here for initial session of cardiac rehab. Found to be in a-fib, rate 120/s once hooked up to monitor. BP stable, patient asymptomatic. Dr. Keesha Lara office notified. Patient escorted to office for further evaluation.  Patient returned et reports

## 2017-12-29 ENCOUNTER — CARDPULM VISIT (OUTPATIENT)
Dept: CARDIAC REHAB | Age: 57
End: 2017-12-29
Attending: INTERNAL MEDICINE

## 2017-12-29 PROCEDURE — 93798 PHYS/QHP OP CAR RHAB W/ECG: CPT

## 2018-01-02 ENCOUNTER — MYAURORA ACCOUNT LINK (OUTPATIENT)
Dept: OTHER | Age: 58
End: 2018-01-02

## 2018-01-02 ENCOUNTER — PRIOR ORIGINAL RECORDS (OUTPATIENT)
Dept: OTHER | Age: 58
End: 2018-01-02

## 2018-01-03 ENCOUNTER — CARDPULM VISIT (OUTPATIENT)
Dept: CARDIAC REHAB | Age: 58
End: 2018-01-03
Attending: INTERNAL MEDICINE
Payer: COMMERCIAL

## 2018-01-03 PROCEDURE — 93798 PHYS/QHP OP CAR RHAB W/ECG: CPT

## 2018-01-05 ENCOUNTER — CARDPULM VISIT (OUTPATIENT)
Dept: CARDIAC REHAB | Age: 58
End: 2018-01-05
Attending: INTERNAL MEDICINE
Payer: COMMERCIAL

## 2018-01-05 ENCOUNTER — PRIOR ORIGINAL RECORDS (OUTPATIENT)
Dept: OTHER | Age: 58
End: 2018-01-05

## 2018-01-05 PROCEDURE — 93798 PHYS/QHP OP CAR RHAB W/ECG: CPT

## 2018-01-08 ENCOUNTER — CARDPULM VISIT (OUTPATIENT)
Dept: CARDIAC REHAB | Age: 58
End: 2018-01-08
Attending: INTERNAL MEDICINE
Payer: COMMERCIAL

## 2018-01-08 PROCEDURE — 93798 PHYS/QHP OP CAR RHAB W/ECG: CPT

## 2018-01-10 ENCOUNTER — CARDPULM VISIT (OUTPATIENT)
Dept: CARDIAC REHAB | Age: 58
End: 2018-01-10
Attending: INTERNAL MEDICINE
Payer: COMMERCIAL

## 2018-01-10 PROCEDURE — 93798 PHYS/QHP OP CAR RHAB W/ECG: CPT

## 2018-01-12 ENCOUNTER — CARDPULM VISIT (OUTPATIENT)
Dept: CARDIAC REHAB | Age: 58
End: 2018-01-12
Attending: INTERNAL MEDICINE
Payer: COMMERCIAL

## 2018-01-12 PROCEDURE — 93798 PHYS/QHP OP CAR RHAB W/ECG: CPT

## 2018-01-15 ENCOUNTER — CARDPULM VISIT (OUTPATIENT)
Dept: CARDIAC REHAB | Age: 58
End: 2018-01-15
Attending: INTERNAL MEDICINE
Payer: COMMERCIAL

## 2018-01-15 LAB
BUN: 24 MG/DL
CALCIUM: 8.4 MG/DL
CHLORIDE: 104 MEQ/L
CREATININE, SERUM: 1.2 MG/DL
GLUCOSE: 150 MG/DL
POTASSIUM, SERUM: 4.4 MEQ/L
SODIUM: 138 MEQ/L

## 2018-01-15 PROCEDURE — 93798 PHYS/QHP OP CAR RHAB W/ECG: CPT

## 2018-01-17 ENCOUNTER — CARDPULM VISIT (OUTPATIENT)
Dept: CARDIAC REHAB | Age: 58
End: 2018-01-17
Attending: INTERNAL MEDICINE
Payer: COMMERCIAL

## 2018-01-17 PROCEDURE — 93798 PHYS/QHP OP CAR RHAB W/ECG: CPT

## 2018-01-19 ENCOUNTER — CARDPULM VISIT (OUTPATIENT)
Dept: CARDIAC REHAB | Age: 58
End: 2018-01-19
Attending: INTERNAL MEDICINE
Payer: COMMERCIAL

## 2018-01-19 PROCEDURE — 93798 PHYS/QHP OP CAR RHAB W/ECG: CPT

## 2018-01-22 ENCOUNTER — CARDPULM VISIT (OUTPATIENT)
Dept: CARDIAC REHAB | Age: 58
End: 2018-01-22
Attending: INTERNAL MEDICINE
Payer: COMMERCIAL

## 2018-01-22 PROCEDURE — 93798 PHYS/QHP OP CAR RHAB W/ECG: CPT

## 2018-01-24 ENCOUNTER — CARDPULM VISIT (OUTPATIENT)
Dept: CARDIAC REHAB | Age: 58
End: 2018-01-24
Attending: INTERNAL MEDICINE
Payer: COMMERCIAL

## 2018-01-24 PROCEDURE — 93798 PHYS/QHP OP CAR RHAB W/ECG: CPT

## 2018-01-26 ENCOUNTER — PRIOR ORIGINAL RECORDS (OUTPATIENT)
Dept: OTHER | Age: 58
End: 2018-01-26

## 2018-01-26 ENCOUNTER — CARDPULM VISIT (OUTPATIENT)
Dept: CARDIAC REHAB | Age: 58
End: 2018-01-26
Attending: INTERNAL MEDICINE
Payer: COMMERCIAL

## 2018-01-26 PROCEDURE — 93798 PHYS/QHP OP CAR RHAB W/ECG: CPT

## 2018-01-29 ENCOUNTER — CARDPULM VISIT (OUTPATIENT)
Dept: CARDIAC REHAB | Age: 58
End: 2018-01-29
Attending: INTERNAL MEDICINE
Payer: COMMERCIAL

## 2018-01-29 PROCEDURE — 93798 PHYS/QHP OP CAR RHAB W/ECG: CPT

## 2018-01-31 ENCOUNTER — CARDPULM VISIT (OUTPATIENT)
Dept: CARDIAC REHAB | Age: 58
End: 2018-01-31
Attending: INTERNAL MEDICINE
Payer: COMMERCIAL

## 2018-01-31 PROCEDURE — 93798 PHYS/QHP OP CAR RHAB W/ECG: CPT

## 2018-02-02 ENCOUNTER — CARDPULM VISIT (OUTPATIENT)
Dept: CARDIAC REHAB | Age: 58
End: 2018-02-02
Attending: INTERNAL MEDICINE
Payer: COMMERCIAL

## 2018-02-02 PROCEDURE — 93798 PHYS/QHP OP CAR RHAB W/ECG: CPT

## 2018-02-05 ENCOUNTER — CARDPULM VISIT (OUTPATIENT)
Dept: CARDIAC REHAB | Age: 58
End: 2018-02-05
Attending: INTERNAL MEDICINE
Payer: COMMERCIAL

## 2018-02-05 PROCEDURE — 93798 PHYS/QHP OP CAR RHAB W/ECG: CPT

## 2018-02-07 ENCOUNTER — CARDPULM VISIT (OUTPATIENT)
Dept: CARDIAC REHAB | Age: 58
End: 2018-02-07
Attending: INTERNAL MEDICINE
Payer: COMMERCIAL

## 2018-02-07 PROCEDURE — 93798 PHYS/QHP OP CAR RHAB W/ECG: CPT

## 2018-02-14 ENCOUNTER — CARDPULM VISIT (OUTPATIENT)
Dept: CARDIAC REHAB | Age: 58
End: 2018-02-14
Attending: INTERNAL MEDICINE
Payer: COMMERCIAL

## 2018-02-14 PROCEDURE — 93798 PHYS/QHP OP CAR RHAB W/ECG: CPT

## 2018-02-16 ENCOUNTER — CARDPULM VISIT (OUTPATIENT)
Dept: CARDIAC REHAB | Age: 58
End: 2018-02-16
Attending: INTERNAL MEDICINE
Payer: COMMERCIAL

## 2018-02-16 PROCEDURE — 93798 PHYS/QHP OP CAR RHAB W/ECG: CPT

## 2018-02-19 ENCOUNTER — CARDPULM VISIT (OUTPATIENT)
Dept: CARDIAC REHAB | Age: 58
End: 2018-02-19
Attending: INTERNAL MEDICINE
Payer: COMMERCIAL

## 2018-02-19 PROCEDURE — 93798 PHYS/QHP OP CAR RHAB W/ECG: CPT

## 2018-02-21 ENCOUNTER — CARDPULM VISIT (OUTPATIENT)
Dept: CARDIAC REHAB | Age: 58
End: 2018-02-21
Attending: INTERNAL MEDICINE
Payer: COMMERCIAL

## 2018-02-21 PROCEDURE — 93798 PHYS/QHP OP CAR RHAB W/ECG: CPT

## 2018-02-23 ENCOUNTER — CARDPULM VISIT (OUTPATIENT)
Dept: CARDIAC REHAB | Age: 58
End: 2018-02-23
Attending: INTERNAL MEDICINE
Payer: COMMERCIAL

## 2018-02-23 PROCEDURE — 93798 PHYS/QHP OP CAR RHAB W/ECG: CPT

## 2018-02-26 ENCOUNTER — CARDPULM VISIT (OUTPATIENT)
Dept: CARDIAC REHAB | Age: 58
End: 2018-02-26
Attending: INTERNAL MEDICINE
Payer: COMMERCIAL

## 2018-02-26 PROCEDURE — 93798 PHYS/QHP OP CAR RHAB W/ECG: CPT

## 2018-03-02 ENCOUNTER — CARDPULM VISIT (OUTPATIENT)
Dept: CARDIAC REHAB | Age: 58
End: 2018-03-02
Attending: INTERNAL MEDICINE
Payer: COMMERCIAL

## 2018-03-02 PROCEDURE — 93798 PHYS/QHP OP CAR RHAB W/ECG: CPT

## 2018-03-09 ENCOUNTER — CARDPULM VISIT (OUTPATIENT)
Dept: CARDIAC REHAB | Age: 58
End: 2018-03-09
Attending: INTERNAL MEDICINE
Payer: COMMERCIAL

## 2018-03-09 PROCEDURE — 93798 PHYS/QHP OP CAR RHAB W/ECG: CPT

## 2018-03-12 ENCOUNTER — CARDPULM VISIT (OUTPATIENT)
Dept: CARDIAC REHAB | Age: 58
End: 2018-03-12
Attending: INTERNAL MEDICINE
Payer: COMMERCIAL

## 2018-03-12 PROCEDURE — 93798 PHYS/QHP OP CAR RHAB W/ECG: CPT

## 2018-03-14 ENCOUNTER — APPOINTMENT (OUTPATIENT)
Dept: CARDIAC REHAB | Age: 58
End: 2018-03-14
Attending: INTERNAL MEDICINE
Payer: COMMERCIAL

## 2018-03-16 ENCOUNTER — CARDPULM VISIT (OUTPATIENT)
Dept: CARDIAC REHAB | Age: 58
End: 2018-03-16
Attending: INTERNAL MEDICINE
Payer: COMMERCIAL

## 2018-03-16 PROCEDURE — 93798 PHYS/QHP OP CAR RHAB W/ECG: CPT

## 2018-03-19 ENCOUNTER — CARDPULM VISIT (OUTPATIENT)
Dept: CARDIAC REHAB | Age: 58
End: 2018-03-19
Attending: INTERNAL MEDICINE
Payer: COMMERCIAL

## 2018-03-19 PROCEDURE — 93798 PHYS/QHP OP CAR RHAB W/ECG: CPT

## 2018-03-21 ENCOUNTER — APPOINTMENT (OUTPATIENT)
Dept: CARDIAC REHAB | Age: 58
End: 2018-03-21
Attending: INTERNAL MEDICINE
Payer: COMMERCIAL

## 2018-03-21 ENCOUNTER — CARDPULM VISIT (OUTPATIENT)
Dept: CARDIAC REHAB | Age: 58
End: 2018-03-21
Attending: INTERNAL MEDICINE
Payer: COMMERCIAL

## 2018-03-21 PROCEDURE — 93798 PHYS/QHP OP CAR RHAB W/ECG: CPT

## 2018-03-23 ENCOUNTER — PRIOR ORIGINAL RECORDS (OUTPATIENT)
Dept: OTHER | Age: 58
End: 2018-03-23

## 2018-03-23 ENCOUNTER — CARDPULM VISIT (OUTPATIENT)
Dept: CARDIAC REHAB | Age: 58
End: 2018-03-23
Attending: INTERNAL MEDICINE
Payer: COMMERCIAL

## 2018-03-23 PROCEDURE — 93798 PHYS/QHP OP CAR RHAB W/ECG: CPT

## 2018-03-28 ENCOUNTER — CARDPULM VISIT (OUTPATIENT)
Dept: CARDIAC REHAB | Age: 58
End: 2018-03-28
Attending: INTERNAL MEDICINE
Payer: COMMERCIAL

## 2018-03-28 PROCEDURE — 93798 PHYS/QHP OP CAR RHAB W/ECG: CPT

## 2018-03-30 ENCOUNTER — PRIOR ORIGINAL RECORDS (OUTPATIENT)
Dept: OTHER | Age: 58
End: 2018-03-30

## 2018-03-30 ENCOUNTER — CARDPULM VISIT (OUTPATIENT)
Dept: CARDIAC REHAB | Age: 58
End: 2018-03-30
Attending: INTERNAL MEDICINE
Payer: COMMERCIAL

## 2018-03-30 PROCEDURE — 93798 PHYS/QHP OP CAR RHAB W/ECG: CPT

## 2018-04-02 ENCOUNTER — CARDPULM VISIT (OUTPATIENT)
Dept: CARDIAC REHAB | Age: 58
End: 2018-04-02
Attending: INTERNAL MEDICINE
Payer: COMMERCIAL

## 2018-04-02 PROCEDURE — 93798 PHYS/QHP OP CAR RHAB W/ECG: CPT

## 2018-04-04 ENCOUNTER — APPOINTMENT (OUTPATIENT)
Dept: CARDIAC REHAB | Age: 58
End: 2018-04-04
Attending: INTERNAL MEDICINE
Payer: COMMERCIAL

## 2018-04-06 ENCOUNTER — APPOINTMENT (OUTPATIENT)
Dept: CARDIAC REHAB | Age: 58
End: 2018-04-06
Attending: INTERNAL MEDICINE
Payer: COMMERCIAL

## 2018-09-05 ENCOUNTER — PRIOR ORIGINAL RECORDS (OUTPATIENT)
Dept: OTHER | Age: 58
End: 2018-09-05

## 2018-09-05 ENCOUNTER — MYAURORA ACCOUNT LINK (OUTPATIENT)
Dept: OTHER | Age: 58
End: 2018-09-05

## 2018-12-28 ENCOUNTER — PRIOR ORIGINAL RECORDS (OUTPATIENT)
Dept: OTHER | Age: 58
End: 2018-12-28

## 2019-01-02 ENCOUNTER — PRIOR ORIGINAL RECORDS (OUTPATIENT)
Dept: OTHER | Age: 59
End: 2019-01-02

## 2019-01-02 LAB
ALT (SGPT): 27 U/L
AST (SGOT): 21 U/L
CHOLESTEROL, TOTAL: 137 MG/DL
HDL CHOLESTEROL: 38 MG/DL
LDL CHOLESTEROL: 80 MG/DL
TRIGLYCERIDES: 111 MG/DL

## 2019-02-28 VITALS
HEART RATE: 108 BPM | SYSTOLIC BLOOD PRESSURE: 110 MMHG | WEIGHT: 217 LBS | DIASTOLIC BLOOD PRESSURE: 70 MMHG | OXYGEN SATURATION: 97 %

## 2019-02-28 VITALS
HEART RATE: 84 BPM | BODY MASS INDEX: 29.12 KG/M2 | HEIGHT: 71 IN | WEIGHT: 208 LBS | SYSTOLIC BLOOD PRESSURE: 98 MMHG | DIASTOLIC BLOOD PRESSURE: 60 MMHG

## 2019-02-28 VITALS
HEART RATE: 64 BPM | SYSTOLIC BLOOD PRESSURE: 106 MMHG | DIASTOLIC BLOOD PRESSURE: 68 MMHG | WEIGHT: 218 LBS | HEIGHT: 71 IN | BODY MASS INDEX: 30.52 KG/M2

## 2019-02-28 VITALS
BODY MASS INDEX: 30.85 KG/M2 | SYSTOLIC BLOOD PRESSURE: 94 MMHG | DIASTOLIC BLOOD PRESSURE: 70 MMHG | HEIGHT: 71 IN | WEIGHT: 220.4 LBS | HEART RATE: 65 BPM

## 2019-02-28 VITALS
BODY MASS INDEX: 29.68 KG/M2 | WEIGHT: 212 LBS | HEIGHT: 71 IN | DIASTOLIC BLOOD PRESSURE: 70 MMHG | SYSTOLIC BLOOD PRESSURE: 110 MMHG | HEART RATE: 70 BPM

## 2019-02-28 VITALS
WEIGHT: 216 LBS | HEIGHT: 71 IN | BODY MASS INDEX: 30.24 KG/M2 | SYSTOLIC BLOOD PRESSURE: 104 MMHG | HEART RATE: 72 BPM | DIASTOLIC BLOOD PRESSURE: 64 MMHG

## 2019-04-14 RX ORDER — LISINOPRIL 2.5 MG/1
TABLET ORAL
COMMUNITY
End: 2019-09-06 | Stop reason: CLARIF

## 2019-04-14 RX ORDER — ATORVASTATIN CALCIUM 40 MG/1
TABLET, FILM COATED ORAL
COMMUNITY
End: 2019-08-31 | Stop reason: SDUPTHER

## 2019-04-14 RX ORDER — METOPROLOL SUCCINATE 50 MG/1
TABLET, EXTENDED RELEASE ORAL
COMMUNITY
End: 2019-11-16 | Stop reason: SDUPTHER

## 2019-09-03 RX ORDER — ATORVASTATIN CALCIUM 40 MG/1
TABLET, FILM COATED ORAL
Qty: 90 TABLET | Refills: 0 | Status: SHIPPED | OUTPATIENT
Start: 2019-09-03 | End: 2020-01-02 | Stop reason: SDUPTHER

## 2019-09-06 ENCOUNTER — OFFICE VISIT (OUTPATIENT)
Dept: CARDIOLOGY | Age: 59
End: 2019-09-06

## 2019-09-06 VITALS
SYSTOLIC BLOOD PRESSURE: 104 MMHG | HEIGHT: 71 IN | WEIGHT: 223 LBS | BODY MASS INDEX: 31.22 KG/M2 | DIASTOLIC BLOOD PRESSURE: 60 MMHG | HEART RATE: 74 BPM

## 2019-09-06 DIAGNOSIS — E78.2 HYPERLIPIDEMIA, MIXED: Primary | ICD-10-CM

## 2019-09-06 DIAGNOSIS — Z98.61 HISTORY OF PTCA: ICD-10-CM

## 2019-09-06 DIAGNOSIS — I10 HYPERTENSION, BENIGN: ICD-10-CM

## 2019-09-06 DIAGNOSIS — I25.10 CORONARY ARTERY DISEASE INVOLVING NATIVE HEART WITHOUT ANGINA PECTORIS, UNSPECIFIED VESSEL OR LESION TYPE: ICD-10-CM

## 2019-09-06 DIAGNOSIS — Z95.1 HX OF CABG: ICD-10-CM

## 2019-09-06 PROCEDURE — 3078F DIAST BP <80 MM HG: CPT | Performed by: INTERNAL MEDICINE

## 2019-09-06 PROCEDURE — 99214 OFFICE O/P EST MOD 30 MIN: CPT | Performed by: INTERNAL MEDICINE

## 2019-09-06 PROCEDURE — 3074F SYST BP LT 130 MM HG: CPT | Performed by: INTERNAL MEDICINE

## 2019-09-06 RX ORDER — ASPIRIN 325 MG
325 TABLET ORAL
COMMUNITY
End: 2020-09-09 | Stop reason: DRUGHIGH

## 2019-09-06 ASSESSMENT — ENCOUNTER SYMPTOMS
WEIGHT LOSS: 0
SUSPICIOUS LESIONS: 0
HEMOPTYSIS: 0
BRUISES/BLEEDS EASILY: 0
COUGH: 0
CHILLS: 0
HEMATOCHEZIA: 0
WEIGHT GAIN: 0
FEVER: 0
ALLERGIC/IMMUNOLOGIC COMMENTS: NO NEW FOOD ALLERGIES

## 2019-09-23 ENCOUNTER — PATIENT OUTREACH (OUTPATIENT)
Dept: FAMILY MEDICINE CLINIC | Facility: CLINIC | Age: 59
End: 2019-09-23

## 2019-11-18 RX ORDER — METOPROLOL SUCCINATE 50 MG/1
TABLET, EXTENDED RELEASE ORAL
Qty: 180 TABLET | Refills: 3 | Status: SHIPPED | OUTPATIENT
Start: 2019-11-18 | End: 2021-01-07

## 2020-01-01 ENCOUNTER — EXTERNAL RECORD (OUTPATIENT)
Dept: HEALTH INFORMATION MANAGEMENT | Facility: OTHER | Age: 60
End: 2020-01-01

## 2020-01-02 RX ORDER — ATORVASTATIN CALCIUM 40 MG/1
TABLET, FILM COATED ORAL
Qty: 90 TABLET | Refills: 0 | Status: SHIPPED | OUTPATIENT
Start: 2020-01-02 | End: 2020-03-31 | Stop reason: SDUPTHER

## 2020-03-31 RX ORDER — ATORVASTATIN CALCIUM 40 MG/1
40 TABLET, FILM COATED ORAL DAILY
Qty: 30 TABLET | Refills: 0 | Status: SHIPPED | OUTPATIENT
Start: 2020-03-31 | End: 2020-05-29

## 2020-05-29 RX ORDER — ATORVASTATIN CALCIUM 40 MG/1
TABLET, FILM COATED ORAL
Qty: 30 TABLET | Refills: 0 | Status: SHIPPED | OUTPATIENT
Start: 2020-05-29 | End: 2020-07-14

## 2020-07-10 LAB
ALT SERPL-CCNC: 21 U/L (ref 9–46)
AST SERPL-CCNC: 16 U/L (ref 10–35)
CHOLEST SERPL-MCNC: 126 MG/DL
CHOLEST/HDLC SERPL: 3.4 (CALC)
HDLC SERPL-MCNC: 37 MG/DL
LDLC SERPL CALC-MCNC: 72 MG/DL (CALC)
NONHDLC SERPL-MCNC: 89 MG/DL (CALC)
TRIGL SERPL-MCNC: 91 MG/DL

## 2020-07-13 ENCOUNTER — TELEPHONE (OUTPATIENT)
Dept: FAMILY MEDICINE CLINIC | Facility: CLINIC | Age: 60
End: 2020-07-13

## 2020-07-13 NOTE — TELEPHONE ENCOUNTER
If it stopped now, that is a good sign, If it restarts, the go to ER. Otherwise, he hasn't seen me in 3 yrs, so schedule a follow up or physical if he is not continuing to get better.    He is due for colonoscopy as well, which is even more important now,

## 2020-07-13 NOTE — TELEPHONE ENCOUNTER
Patient wife states Friday and Saturday patient had cramping and episodes of diarrhea with some blood in it. He also had 5 episodes where he felt like he needed to have a BM and only blood came out. States diarrhea and blood have resolved.   States he had

## 2020-07-13 NOTE — TELEPHONE ENCOUNTER
Patient wife notified and verbalized understanding.   appt scheduled    Future Appointments   Date Time Provider Kari Williamson   7/14/2020  1:00 PM Arnav Landa [facility] MARIA LUZ Zavala

## 2020-07-14 ENCOUNTER — OFFICE VISIT (OUTPATIENT)
Dept: FAMILY MEDICINE CLINIC | Facility: CLINIC | Age: 60
End: 2020-07-14
Payer: COMMERCIAL

## 2020-07-14 VITALS
BODY MASS INDEX: 29.96 KG/M2 | DIASTOLIC BLOOD PRESSURE: 70 MMHG | WEIGHT: 214 LBS | HEIGHT: 71 IN | TEMPERATURE: 99 F | SYSTOLIC BLOOD PRESSURE: 110 MMHG | HEART RATE: 66 BPM

## 2020-07-14 DIAGNOSIS — K92.1: ICD-10-CM

## 2020-07-14 DIAGNOSIS — Z00.00 HEALTHY ADULT ON ROUTINE PHYSICAL EXAMINATION: Primary | ICD-10-CM

## 2020-07-14 DIAGNOSIS — Z95.1 S/P CABG (CORONARY ARTERY BYPASS GRAFT): ICD-10-CM

## 2020-07-14 LAB
ALBUMIN SERPL-MCNC: 4.1 G/DL (ref 3.4–5)
ALBUMIN/GLOB SERPL: 1.4 {RATIO} (ref 1–2)
ALP LIVER SERPL-CCNC: 72 U/L (ref 45–117)
ALT SERPL-CCNC: 26 U/L (ref 16–61)
ANION GAP SERPL CALC-SCNC: 2 MMOL/L (ref 0–18)
AST SERPL-CCNC: 16 U/L (ref 15–37)
BASOPHILS # BLD AUTO: 0.04 X10(3) UL (ref 0–0.2)
BASOPHILS NFR BLD AUTO: 0.5 %
BILIRUB SERPL-MCNC: 0.8 MG/DL (ref 0.1–2)
BUN BLD-MCNC: 18 MG/DL (ref 7–18)
BUN/CREAT SERPL: 14 (ref 10–20)
CALCIUM BLD-MCNC: 9 MG/DL (ref 8.5–10.1)
CHLORIDE SERPL-SCNC: 109 MMOL/L (ref 98–112)
CO2 SERPL-SCNC: 26 MMOL/L (ref 21–32)
COMPLEXED PSA SERPL-MCNC: 1.32 NG/ML (ref ?–4)
CREAT BLD-MCNC: 1.29 MG/DL (ref 0.7–1.3)
DEPRECATED RDW RBC AUTO: 42.5 FL (ref 35.1–46.3)
EOSINOPHIL # BLD AUTO: 0.11 X10(3) UL (ref 0–0.7)
EOSINOPHIL NFR BLD AUTO: 1.4 %
ERYTHROCYTE [DISTWIDTH] IN BLOOD BY AUTOMATED COUNT: 12.9 % (ref 11–15)
GLOBULIN PLAS-MCNC: 2.9 G/DL (ref 2.8–4.4)
GLUCOSE BLD-MCNC: 88 MG/DL (ref 70–99)
HCT VFR BLD AUTO: 41.4 % (ref 39–53)
HGB BLD-MCNC: 14.1 G/DL (ref 13–17.5)
IMM GRANULOCYTES # BLD AUTO: 0.02 X10(3) UL (ref 0–1)
IMM GRANULOCYTES NFR BLD: 0.3 %
LYMPHOCYTES # BLD AUTO: 1.51 X10(3) UL (ref 1–4)
LYMPHOCYTES NFR BLD AUTO: 19.9 %
M PROTEIN MFR SERPL ELPH: 7 G/DL (ref 6.4–8.2)
MCH RBC QN AUTO: 30.7 PG (ref 26–34)
MCHC RBC AUTO-ENTMCNC: 34.1 G/DL (ref 31–37)
MCV RBC AUTO: 90.2 FL (ref 80–100)
MONOCYTES # BLD AUTO: 0.65 X10(3) UL (ref 0.1–1)
MONOCYTES NFR BLD AUTO: 8.6 %
NEUTROPHILS # BLD AUTO: 5.26 X10 (3) UL (ref 1.5–7.7)
NEUTROPHILS # BLD AUTO: 5.26 X10(3) UL (ref 1.5–7.7)
NEUTROPHILS NFR BLD AUTO: 69.3 %
OSMOLALITY SERPL CALC.SUM OF ELEC: 285 MOSM/KG (ref 275–295)
PATIENT FASTING Y/N/NP: NO
PLATELET # BLD AUTO: 197 10(3)UL (ref 150–450)
POTASSIUM SERPL-SCNC: 4.3 MMOL/L (ref 3.5–5.1)
RBC # BLD AUTO: 4.59 X10(6)UL (ref 4.3–5.7)
SODIUM SERPL-SCNC: 137 MMOL/L (ref 136–145)
WBC # BLD AUTO: 7.6 X10(3) UL (ref 4–11)

## 2020-07-14 PROCEDURE — 85025 COMPLETE CBC W/AUTO DIFF WBC: CPT | Performed by: FAMILY MEDICINE

## 2020-07-14 PROCEDURE — 80053 COMPREHEN METABOLIC PANEL: CPT | Performed by: FAMILY MEDICINE

## 2020-07-14 PROCEDURE — 99396 PREV VISIT EST AGE 40-64: CPT | Performed by: FAMILY MEDICINE

## 2020-07-14 RX ORDER — ATORVASTATIN CALCIUM 40 MG/1
TABLET, FILM COATED ORAL
Qty: 90 TABLET | Refills: 3 | Status: SHIPPED | OUTPATIENT
Start: 2020-07-14

## 2020-07-14 RX ORDER — TURMERIC 100 %
POWDER (GRAM) MISCELLANEOUS DAILY
COMMUNITY

## 2020-07-14 NOTE — PROGRESS NOTES
Cata Muse is a 61year old male who presents for a complete physical exam.   HPI:   Pt complains of diarrhea. Wednesday ate some questionable turkey. Had another big sandwich on Thursday. Woke up the next day with diarrhea on Friday.  That afternoon, Medical History:   Diagnosis Date   • Coronary atherosclerosis    • Heart attack ([facility] [address] 2002   • High blood pressure    • High cholesterol    • Visual impairment     glasses      Past Surgical History:   Procedure Laterality Date   • BYPASS SURGERY  11/18/2 history  ALL/ASTHMA: denies hx of allergy or asthma    EXAM:   /70   Pulse 66   Temp 98.8 °F (37.1 °C) (Temporal)   Ht 71\"   Wt 214 lb (97.1 kg)   BMI 29.85 kg/m²   Body mass index is 29.85 kg/m².    GENERAL: well developed, well nourished,in no appa & Consults:  None

## 2020-07-15 PROBLEM — K92.1 HEMATOCHEZIA: Status: ACTIVE | Noted: 2020-07-15

## 2020-07-17 ENCOUNTER — TELEPHONE (OUTPATIENT)
Dept: CARDIOLOGY | Age: 60
End: 2020-07-17

## 2020-07-17 DIAGNOSIS — E78.2 HYPERLIPIDEMIA, MIXED: ICD-10-CM

## 2020-07-21 ENCOUNTER — LAB ENCOUNTER (OUTPATIENT)
Dept: LAB | Facility: HOSPITAL | Age: 60
End: 2020-07-21
Attending: INTERNAL MEDICINE
Payer: COMMERCIAL

## 2020-07-21 DIAGNOSIS — Z01.818 PRE-OP TESTING: ICD-10-CM

## 2020-07-22 LAB — SARS-COV-2 RNA RESP QL NAA+PROBE: NOT DETECTED

## 2020-07-24 PROBLEM — R19.7 DIARRHEA: Status: ACTIVE | Noted: 2020-07-24

## 2020-07-24 PROBLEM — Z86.010 PERSONAL HISTORY OF COLONIC POLYPS: Status: ACTIVE | Noted: 2020-07-24

## 2020-07-24 PROBLEM — D12.0 BENIGN NEOPLASM OF CECUM: Status: ACTIVE | Noted: 2020-07-24

## 2020-07-24 PROBLEM — Z86.0100 PERSONAL HISTORY OF COLONIC POLYPS: Status: ACTIVE | Noted: 2020-07-24

## 2020-09-09 ENCOUNTER — V-VISIT (OUTPATIENT)
Dept: CARDIOLOGY | Age: 60
End: 2020-09-09

## 2020-09-09 VITALS
DIASTOLIC BLOOD PRESSURE: 88 MMHG | BODY MASS INDEX: 31.22 KG/M2 | WEIGHT: 223 LBS | HEIGHT: 71 IN | HEART RATE: 63 BPM | SYSTOLIC BLOOD PRESSURE: 127 MMHG

## 2020-09-09 DIAGNOSIS — E78.2 HYPERLIPIDEMIA, MIXED: ICD-10-CM

## 2020-09-09 DIAGNOSIS — I25.10 CORONARY ARTERY DISEASE INVOLVING NATIVE HEART WITHOUT ANGINA PECTORIS, UNSPECIFIED VESSEL OR LESION TYPE: Primary | ICD-10-CM

## 2020-09-09 DIAGNOSIS — Z98.61 HISTORY OF PTCA: ICD-10-CM

## 2020-09-09 DIAGNOSIS — Z95.1 HX OF CABG: ICD-10-CM

## 2020-09-09 DIAGNOSIS — I10 HYPERTENSION, BENIGN: ICD-10-CM

## 2020-09-09 PROCEDURE — 99213 OFFICE O/P EST LOW 20 MIN: CPT | Performed by: INTERNAL MEDICINE

## 2020-09-09 PROCEDURE — 3074F SYST BP LT 130 MM HG: CPT | Performed by: INTERNAL MEDICINE

## 2020-09-09 PROCEDURE — 3079F DIAST BP 80-89 MM HG: CPT | Performed by: INTERNAL MEDICINE

## 2020-09-09 SDOH — SOCIAL STABILITY: SOCIAL NETWORK: ARE YOU MARRIED, WIDOWED, DIVORCED, SEPARATED, NEVER MARRIED, OR LIVING WITH A PARTNER?: MARRIED

## 2020-09-09 SDOH — HEALTH STABILITY: PHYSICAL HEALTH: ON AVERAGE, HOW MANY MINUTES DO YOU ENGAGE IN EXERCISE AT THIS LEVEL?: 0 MIN

## 2020-09-09 SDOH — HEALTH STABILITY: PHYSICAL HEALTH: ON AVERAGE, HOW MANY DAYS PER WEEK DO YOU ENGAGE IN MODERATE TO STRENUOUS EXERCISE (LIKE A BRISK WALK)?: 0 DAYS

## 2020-09-09 ASSESSMENT — ENCOUNTER SYMPTOMS
HEMATOCHEZIA: 0
COUGH: 0
CHILLS: 0
HEMOPTYSIS: 0
SUSPICIOUS LESIONS: 0
BRUISES/BLEEDS EASILY: 0
FEVER: 0
WEIGHT LOSS: 0
ALLERGIC/IMMUNOLOGIC COMMENTS: NO NEW FOOD ALLERGIES
WEIGHT GAIN: 0

## 2020-09-09 ASSESSMENT — PATIENT HEALTH QUESTIONNAIRE - PHQ9
CLINICAL INTERPRETATION OF PHQ2 SCORE: NO FURTHER SCREENING NEEDED
SUM OF ALL RESPONSES TO PHQ9 QUESTIONS 1 AND 2: 0
2. FEELING DOWN, DEPRESSED OR HOPELESS: NOT AT ALL
1. LITTLE INTEREST OR PLEASURE IN DOING THINGS: NOT AT ALL
CLINICAL INTERPRETATION OF PHQ9 SCORE: NO FURTHER SCREENING NEEDED
SUM OF ALL RESPONSES TO PHQ9 QUESTIONS 1 AND 2: 0

## 2020-09-11 ENCOUNTER — MED REC SCAN ONLY (OUTPATIENT)
Dept: FAMILY MEDICINE CLINIC | Facility: CLINIC | Age: 60
End: 2020-09-11

## 2021-01-07 RX ORDER — METOPROLOL SUCCINATE 50 MG/1
TABLET, EXTENDED RELEASE ORAL
Qty: 180 TABLET | Refills: 3 | Status: SHIPPED | OUTPATIENT
Start: 2021-01-07

## 2021-03-22 NOTE — TELEPHONE ENCOUNTER
Last ov 3/16/21 pt is out of town and need this rx sent to [facility] [phone] in [place], [place].  If any questions give pt a call [phone] Pt's wife called he ate thinks he ate bad turkey last Thursday. After eating that Friday and Saturday, he had bad stomach cramps and diarrhea with blood. No other symptoms of being sick.  When he felt like he needs to go it was only 5x and it was just blood

## 2021-04-26 ENCOUNTER — TELEPHONE (OUTPATIENT)
Dept: FAMILY MEDICINE CLINIC | Facility: CLINIC | Age: 61
End: 2021-04-26

## 2021-04-26 DIAGNOSIS — S91.119A: Primary | ICD-10-CM

## 2021-04-26 NOTE — TELEPHONE ENCOUNTER
Per vaccine registry last td [address] with patient wife who states patient cut/broke his toe and was in ER over the weekend. They were going to give him Tdap but is was not quite 14 days since his covid vaccine. Told him to get it Monday.     Zahira rios

## 2021-04-27 ENCOUNTER — NURSE ONLY (OUTPATIENT)
Dept: FAMILY MEDICINE CLINIC | Facility: CLINIC | Age: 61
End: 2021-04-27
Payer: COMMERCIAL

## 2021-04-27 PROCEDURE — 90471 IMMUNIZATION ADMIN: CPT | Performed by: FAMILY MEDICINE

## 2021-04-27 PROCEDURE — 90715 TDAP VACCINE 7 YRS/> IM: CPT | Performed by: FAMILY MEDICINE

## 2021-04-27 NOTE — PROGRESS NOTES
Patient to clinic for Tdap  VIS provided    Patient received Tdap IM left deltoid  Left office in stable condition.

## 2021-07-06 ENCOUNTER — TELEPHONE (OUTPATIENT)
Dept: CARDIOLOGY | Age: 61
End: 2021-07-06

## 2022-02-18 ENCOUNTER — HOSPITAL ENCOUNTER (OUTPATIENT)
Dept: CV DIAGNOSTICS | Age: 62
Discharge: HOME | End: 2022-02-18
Attending: INTERNAL MEDICINE
Payer: COMMERCIAL

## 2022-02-18 DIAGNOSIS — I10 HYPERTENSION, BENIGN: ICD-10-CM

## 2022-02-18 DIAGNOSIS — I25.10 CAD (CORONARY ARTERY DISEASE): ICD-10-CM

## 2022-02-18 PROCEDURE — 93306 TTE W/DOPPLER COMPLETE: CPT | Performed by: INTERNAL MEDICINE

## 2022-03-04 ENCOUNTER — ORDER TRANSCRIPTION (OUTPATIENT)
Dept: ADMINISTRATIVE | Facility: HOSPITAL | Age: 62
End: 2022-03-04

## 2022-03-25 ENCOUNTER — OFFICE VISIT (OUTPATIENT)
Dept: FAMILY MEDICINE CLINIC | Facility: CLINIC | Age: 62
End: 2022-03-25
Payer: COMMERCIAL

## 2022-03-25 VITALS
HEART RATE: 67 BPM | DIASTOLIC BLOOD PRESSURE: 86 MMHG | OXYGEN SATURATION: 98 % | SYSTOLIC BLOOD PRESSURE: 126 MMHG | HEIGHT: 71 IN | RESPIRATION RATE: 16 BRPM | TEMPERATURE: 98 F | WEIGHT: 222 LBS | BODY MASS INDEX: 31.08 KG/M2

## 2022-03-25 DIAGNOSIS — R39.9 LOWER URINARY TRACT SYMPTOMS (LUTS): ICD-10-CM

## 2022-03-25 DIAGNOSIS — M54.50 CHRONIC BILATERAL LOW BACK PAIN WITHOUT SCIATICA: ICD-10-CM

## 2022-03-25 DIAGNOSIS — K40.21 BILATERAL RECURRENT INGUINAL HERNIA WITHOUT OBSTRUCTION OR GANGRENE: Primary | ICD-10-CM

## 2022-03-25 DIAGNOSIS — G89.29 CHRONIC BILATERAL LOW BACK PAIN WITHOUT SCIATICA: ICD-10-CM

## 2022-03-25 DIAGNOSIS — Z12.5 SCREENING FOR MALIGNANT NEOPLASM OF PROSTATE: ICD-10-CM

## 2022-03-25 PROCEDURE — 3079F DIAST BP 80-89 MM HG: CPT | Performed by: FAMILY MEDICINE

## 2022-03-25 PROCEDURE — 99214 OFFICE O/P EST MOD 30 MIN: CPT | Performed by: FAMILY MEDICINE

## 2022-03-25 PROCEDURE — 3008F BODY MASS INDEX DOCD: CPT | Performed by: FAMILY MEDICINE

## 2022-03-25 PROCEDURE — 3074F SYST BP LT 130 MM HG: CPT | Performed by: FAMILY MEDICINE

## 2022-04-05 ENCOUNTER — OFFICE VISIT (OUTPATIENT)
Dept: SURGERY | Facility: CLINIC | Age: 62
End: 2022-04-05
Payer: COMMERCIAL

## 2022-04-05 VITALS
TEMPERATURE: 98 F | BODY MASS INDEX: 31.08 KG/M2 | DIASTOLIC BLOOD PRESSURE: 79 MMHG | WEIGHT: 222 LBS | HEIGHT: 71 IN | HEART RATE: 65 BPM | SYSTOLIC BLOOD PRESSURE: 113 MMHG

## 2022-04-05 DIAGNOSIS — K46.9 RECURRENT HERNIA: Primary | ICD-10-CM

## 2022-04-05 DIAGNOSIS — R10.31 BILATERAL GROIN PAIN: ICD-10-CM

## 2022-04-05 DIAGNOSIS — R10.32 BILATERAL GROIN PAIN: ICD-10-CM

## 2022-04-05 PROCEDURE — 3008F BODY MASS INDEX DOCD: CPT | Performed by: SURGERY

## 2022-04-05 PROCEDURE — 3074F SYST BP LT 130 MM HG: CPT | Performed by: SURGERY

## 2022-04-05 PROCEDURE — 3078F DIAST BP <80 MM HG: CPT | Performed by: SURGERY

## 2022-04-05 PROCEDURE — 99245 OFF/OP CONSLTJ NEW/EST HI 55: CPT | Performed by: SURGERY

## 2022-04-13 ENCOUNTER — HOSPITAL ENCOUNTER (OUTPATIENT)
Dept: ULTRASOUND IMAGING | Age: 62
Discharge: HOME | End: 2022-04-13
Attending: SURGERY
Payer: COMMERCIAL

## 2022-04-13 DIAGNOSIS — K46.9 RECURRENT HERNIA: ICD-10-CM

## 2022-04-13 PROCEDURE — 76882 US LMTD JT/FCL EVL NVASC XTR: CPT | Performed by: SURGERY

## 2022-04-16 LAB
ABSOLUTE BASOPHILS: 68 CELLS/UL (ref 0–200)
ABSOLUTE EOSINOPHILS: 190 CELLS/UL (ref 15–500)
ABSOLUTE LYMPHOCYTES: 1786 CELLS/UL (ref 850–3900)
ABSOLUTE MONOCYTES: 745 CELLS/UL (ref 200–950)
ABSOLUTE NEUTROPHILS: 4811 CELLS/UL (ref 1500–7800)
APPEARANCE: CLEAR
BASOPHILS: 0.9 %
BILIRUBIN: NEGATIVE
COLOR: YELLOW
EOSINOPHILS: 2.5 %
GLUCOSE: NEGATIVE
HEMATOCRIT: 46.3 % (ref 38.5–50)
HEMOGLOBIN: 15.5 G/DL (ref 13.2–17.1)
KETONES: NEGATIVE
LEUKOCYTE ESTERASE: NEGATIVE
LYMPHOCYTES: 23.5 %
MCH: 30.6 PG (ref 27–33)
MCHC: 33.5 G/DL (ref 32–36)
MCV: 91.3 FL (ref 80–100)
MONOCYTES: 9.8 %
MPV: 10.2 FL (ref 7.5–12.5)
NEUTROPHILS: 63.3 %
NITRITE: NEGATIVE
OCCULT BLOOD: NEGATIVE
PH: 6.5 (ref 5–8)
PLATELET COUNT: 209 THOUSAND/UL (ref 140–400)
PROTEIN: NEGATIVE
PSA, TOTAL: 1.27 NG/ML
RDW: 12.8 % (ref 11–15)
RED BLOOD CELL COUNT: 5.07 MILLION/UL (ref 4.2–5.8)
SPECIFIC GRAVITY: 1.02 (ref 1–1.03)
WHITE BLOOD CELL COUNT: 7.6 THOUSAND/UL (ref 3.8–10.8)

## 2022-04-29 ENCOUNTER — OFFICE VISIT (OUTPATIENT)
Dept: SURGERY | Facility: CLINIC | Age: 62
End: 2022-04-29
Payer: COMMERCIAL

## 2022-04-29 VITALS
SYSTOLIC BLOOD PRESSURE: 123 MMHG | WEIGHT: 222 LBS | DIASTOLIC BLOOD PRESSURE: 84 MMHG | BODY MASS INDEX: 31.08 KG/M2 | HEART RATE: 63 BPM | HEIGHT: 71 IN | TEMPERATURE: 97 F

## 2022-04-29 DIAGNOSIS — K40.91 UNILATERAL RECURRENT INGUINAL HERNIA WITHOUT OBSTRUCTION OR GANGRENE: Primary | ICD-10-CM

## 2022-04-29 DIAGNOSIS — Z98.890 H/O BILATERAL INGUINAL HERNIA REPAIR: ICD-10-CM

## 2022-04-29 DIAGNOSIS — Z87.19 H/O BILATERAL INGUINAL HERNIA REPAIR: ICD-10-CM

## 2022-04-29 PROCEDURE — 3074F SYST BP LT 130 MM HG: CPT | Performed by: SURGERY

## 2022-04-29 PROCEDURE — 3008F BODY MASS INDEX DOCD: CPT | Performed by: SURGERY

## 2022-04-29 PROCEDURE — 99215 OFFICE O/P EST HI 40 MIN: CPT | Performed by: SURGERY

## 2022-04-29 PROCEDURE — 3079F DIAST BP 80-89 MM HG: CPT | Performed by: SURGERY

## 2022-07-18 DIAGNOSIS — K40.91 UNILATERAL RECURRENT INGUINAL HERNIA WITHOUT OBSTRUCTION OR GANGRENE: Primary | ICD-10-CM

## 2022-07-31 ENCOUNTER — LAB ENCOUNTER (OUTPATIENT)
Dept: LAB | Facility: HOSPITAL | Age: 62
End: 2022-07-31
Attending: INTERNAL MEDICINE
Payer: COMMERCIAL

## 2022-07-31 DIAGNOSIS — Z11.59 ENCOUNTER FOR SCREENING FOR OTHER VIRAL DISEASES: ICD-10-CM

## 2022-07-31 DIAGNOSIS — Z01.818 PREOP EXAMINATION: ICD-10-CM

## 2022-08-01 LAB — SARS-COV-2 RNA RESP QL NAA+PROBE: NOT DETECTED

## 2022-08-10 ENCOUNTER — TELEPHONE (OUTPATIENT)
Dept: SCHEDULING | Age: 62
End: 2022-08-10

## 2022-10-10 ENCOUNTER — TELEPHONE (OUTPATIENT)
Facility: LOCATION | Age: 62
End: 2022-10-10

## 2022-10-10 NOTE — TELEPHONE ENCOUNTER
Pt misplaced his surgery instructions, wants to go over and be sure he knows what to do.  Best callback number is [phone]

## 2022-10-12 ENCOUNTER — TELEPHONE (OUTPATIENT)
Dept: FAMILY MEDICINE CLINIC | Facility: CLINIC | Age: 62
End: 2022-10-12

## 2022-10-12 DIAGNOSIS — Z01.818 PRE-OP EVALUATION: Primary | ICD-10-CM

## 2022-10-12 RX ORDER — METOPROLOL SUCCINATE 100 MG/1
100 TABLET, EXTENDED RELEASE ORAL DAILY
COMMUNITY
Start: 2022-08-20

## 2022-10-12 NOTE — TELEPHONE ENCOUNTER
PATIENT SCHEDULED HIMSELF FOR EKG ON Friday 10-14-22. PATIENT IS HAVING A HERNIA REPAIR ON Monday. ORDER IS THE BLUE BOOK, BUT ALSO ASKS FOR FASTING BMP(4HRS). PATIENT SAYS HE HAD FASTING LABS AT [facility] DONE IN September 2022. I DO NOT SEE THESE RESULTS. PATIENT SAYS DR Mirna Mckee (CARDIOLOGY) RECENTLY SAW HIM IN OFFICE AND TOLD PATIENT HIS LABS LOOK GOOD. PATIENT SAYS DR Mirna Mckee HAS THE LAB RESULTS, BUT DIDN'T WRITE THE ORDER.  PLEASE LET  KNOW IF WE NEED TO CALL HIM TO COME IN FASTING FOR BMP

## 2022-10-13 NOTE — TELEPHONE ENCOUNTER
Per preop letter in [facility]:    EKG READ AND SIGNED WITHIN   90 days  BMP (requires 4 hour fast) within  90 days      Spoke cardiology dept and requested any bmp or EKG within the last 90 days

## 2022-10-13 NOTE — TELEPHONE ENCOUNTER
Does Dr. Silvana Mcgowan need the [facility] for surgery? Is that where that is coming from? If so, that is fine.

## 2022-10-13 NOTE — TELEPHONE ENCOUNTER
Received copy of last EKG, done 1/28/22  No lab results    Will need EKG and BMP    Routed to KE for orders

## 2022-10-14 ENCOUNTER — NURSE ONLY (OUTPATIENT)
Dept: FAMILY MEDICINE CLINIC | Facility: CLINIC | Age: 62
End: 2022-10-14
Payer: COMMERCIAL

## 2022-10-14 ENCOUNTER — TELEPHONE (OUTPATIENT)
Dept: FAMILY MEDICINE CLINIC | Facility: CLINIC | Age: 62
End: 2022-10-14

## 2022-10-14 ENCOUNTER — ANESTHESIA EVENT (OUTPATIENT)
Dept: SURGERY | Facility: HOSPITAL | Age: 62
End: 2022-10-14
Payer: COMMERCIAL

## 2022-10-14 DIAGNOSIS — Z01.818 PRE-OP EVALUATION: ICD-10-CM

## 2022-10-14 DIAGNOSIS — Z01.818 PREOP TESTING: Primary | ICD-10-CM

## 2022-10-14 DIAGNOSIS — Z95.1 S/P CABG (CORONARY ARTERY BYPASS GRAFT): ICD-10-CM

## 2022-10-14 LAB
ANION GAP SERPL CALC-SCNC: 2 MMOL/L (ref 0–18)
BUN BLD-MCNC: 22 MG/DL (ref 7–18)
CALCIUM BLD-MCNC: 8.9 MG/DL (ref 8.5–10.1)
CHLORIDE SERPL-SCNC: 112 MMOL/L (ref 98–112)
CO2 SERPL-SCNC: 27 MMOL/L (ref 21–32)
CREAT BLD-MCNC: 1.59 MG/DL
FASTING STATUS PATIENT QL REPORTED: YES
GFR SERPLBLD BASED ON 1.73 SQ M-ARVRAT: 49 ML/MIN/1.73M2 (ref 60–?)
GLUCOSE BLD-MCNC: 91 MG/DL (ref 70–99)
OSMOLALITY SERPL CALC.SUM OF ELEC: 295 MOSM/KG (ref 275–295)
POTASSIUM SERPL-SCNC: 4.1 MMOL/L (ref 3.5–5.1)
SODIUM SERPL-SCNC: 141 MMOL/L (ref 136–145)

## 2022-10-14 PROCEDURE — 80048 BASIC METABOLIC PNL TOTAL CA: CPT | Performed by: FAMILY MEDICINE

## 2022-10-14 NOTE — TELEPHONE ENCOUNTER
ekg faxed to [facility] pre[facility] dept  BMP drawn and pending in epic.     EKG in red folder in bottom bin if needing to be refaxed

## 2022-10-14 NOTE — PAT NURSING NOTE
Called ,pcp office this am and spoke to [employer] [address] regarding required pretesting results. She stated patient has an appointment with nurse to complete BMP and EKG. Called  office and spoke to [facility] who stated that patient did not show up for appointment. Called patient and left message on voicemail requesting that he call PAT back regarding required pretesting. Attempted to Reach ,surgeon office to advise but office is closed. Patient called at 1 and stated he forgot. He is attempting to call pcp office to have testing.

## 2022-10-14 NOTE — PROGRESS NOTES
Patient to clinic for preop BMP and EKG    EKG performed and reviewed by EDGARDO Valdes tube drawn left AC x 1 attempt    Patient left office in stable condition.

## 2022-10-17 ENCOUNTER — HOSPITAL ENCOUNTER (OUTPATIENT)
Facility: HOSPITAL | Age: 62
Setting detail: HOSPITAL OUTPATIENT SURGERY
Discharge: HOME | End: 2022-10-17
Attending: SURGERY | Admitting: SURGERY
Payer: COMMERCIAL

## 2022-10-17 ENCOUNTER — ANESTHESIA (OUTPATIENT)
Dept: SURGERY | Facility: HOSPITAL | Age: 62
End: 2022-10-17
Payer: COMMERCIAL

## 2022-10-17 VITALS
WEIGHT: 222 LBS | SYSTOLIC BLOOD PRESSURE: 134 MMHG | BODY MASS INDEX: 31 KG/M2 | OXYGEN SATURATION: 97 % | RESPIRATION RATE: 18 BRPM | HEART RATE: 73 BPM | TEMPERATURE: 98 F | DIASTOLIC BLOOD PRESSURE: 86 MMHG

## 2022-10-17 DIAGNOSIS — Z01.818 PRE-OP TESTING: Primary | ICD-10-CM

## 2022-10-17 DIAGNOSIS — K40.91 UNILATERAL RECURRENT INGUINAL HERNIA WITHOUT OBSTRUCTION OR GANGRENE: ICD-10-CM

## 2022-10-17 LAB
ATRIAL RATE: 66 BPM
P AXIS: 63 DEGREES
P-R INTERVAL: 150 MS
Q-T INTERVAL: 400 MS
QRS DURATION: 102 MS
QTC CALCULATION (BEZET): 419 MS
R AXIS: 10 DEGREES
T AXIS: 78 DEGREES
VENTRICULAR RATE: 66 BPM

## 2022-10-17 PROCEDURE — 0YU64JZ SUPPLEMENT LEFT INGUINAL REGION WITH SYNTHETIC SUBSTITUTE, PERCUTANEOUS ENDOSCOPIC APPROACH: ICD-10-PCS | Performed by: SURGERY

## 2022-10-17 DEVICE — BARD MESH PERFIX PLUG, LARGE
Type: IMPLANTABLE DEVICE | Site: INGUINAL | Status: FUNCTIONAL
Brand: BARD MESH PERFIX PLUG

## 2022-10-17 RX ORDER — MEPERIDINE HYDROCHLORIDE 25 MG/ML
12.5 INJECTION INTRAMUSCULAR; INTRAVENOUS; SUBCUTANEOUS AS NEEDED
Status: DISCONTINUED | OUTPATIENT
Start: 2022-10-17 | End: 2022-10-17

## 2022-10-17 RX ORDER — HYDROCODONE BITARTRATE AND ACETAMINOPHEN 5; 325 MG/1; MG/1
2 TABLET ORAL ONCE AS NEEDED
Status: COMPLETED | OUTPATIENT
Start: 2022-10-17 | End: 2022-10-17

## 2022-10-17 RX ORDER — POLYMYXIN B 500000 [USP'U]/1
INJECTION, POWDER, LYOPHILIZED, FOR SOLUTION INTRAMUSCULAR; INTRATHECAL; INTRAVENOUS; OPHTHALMIC AS NEEDED
Status: DISCONTINUED | OUTPATIENT
Start: 2022-10-17 | End: 2022-10-17 | Stop reason: HOSPADM

## 2022-10-17 RX ORDER — SODIUM CHLORIDE, SODIUM LACTATE, POTASSIUM CHLORIDE, CALCIUM CHLORIDE 600; 310; 30; 20 MG/100ML; MG/100ML; MG/100ML; MG/100ML
INJECTION, SOLUTION INTRAVENOUS CONTINUOUS
Status: DISCONTINUED | OUTPATIENT
Start: 2022-10-17 | End: 2022-10-17

## 2022-10-17 RX ORDER — ONDANSETRON 2 MG/ML
INJECTION INTRAMUSCULAR; INTRAVENOUS AS NEEDED
Status: DISCONTINUED | OUTPATIENT
Start: 2022-10-17 | End: 2022-10-17 | Stop reason: SURG

## 2022-10-17 RX ORDER — ACETAMINOPHEN 500 MG
1000 TABLET ORAL ONCE
Status: DISCONTINUED | OUTPATIENT
Start: 2022-10-17 | End: 2022-10-17 | Stop reason: HOSPADM

## 2022-10-17 RX ORDER — ONDANSETRON 2 MG/ML
4 INJECTION INTRAMUSCULAR; INTRAVENOUS EVERY 6 HOURS PRN
Status: DISCONTINUED | OUTPATIENT
Start: 2022-10-17 | End: 2022-10-17

## 2022-10-17 RX ORDER — HYDROMORPHONE HYDROCHLORIDE 1 MG/ML
0.6 INJECTION, SOLUTION INTRAMUSCULAR; INTRAVENOUS; SUBCUTANEOUS EVERY 5 MIN PRN
Status: DISCONTINUED | OUTPATIENT
Start: 2022-10-17 | End: 2022-10-17

## 2022-10-17 RX ORDER — SCOLOPAMINE TRANSDERMAL SYSTEM 1 MG/1
1 PATCH, EXTENDED RELEASE TRANSDERMAL ONCE
Status: DISCONTINUED | OUTPATIENT
Start: 2022-10-17 | End: 2022-10-17 | Stop reason: HOSPADM

## 2022-10-17 RX ORDER — NALOXONE HYDROCHLORIDE 0.4 MG/ML
80 INJECTION, SOLUTION INTRAMUSCULAR; INTRAVENOUS; SUBCUTANEOUS AS NEEDED
Status: DISCONTINUED | OUTPATIENT
Start: 2022-10-17 | End: 2022-10-17

## 2022-10-17 RX ORDER — HYDROCODONE BITARTRATE AND ACETAMINOPHEN 5; 325 MG/1; MG/1
1 TABLET ORAL EVERY 6 HOURS PRN
Qty: 20 TABLET | Refills: 0 | Status: SHIPPED | OUTPATIENT
Start: 2022-10-17

## 2022-10-17 RX ORDER — DEXAMETHASONE SODIUM PHOSPHATE 4 MG/ML
VIAL (ML) INJECTION AS NEEDED
Status: DISCONTINUED | OUTPATIENT
Start: 2022-10-17 | End: 2022-10-17 | Stop reason: SURG

## 2022-10-17 RX ORDER — PROCHLORPERAZINE EDISYLATE 5 MG/ML
5 INJECTION INTRAMUSCULAR; INTRAVENOUS EVERY 8 HOURS PRN
Status: DISCONTINUED | OUTPATIENT
Start: 2022-10-17 | End: 2022-10-17

## 2022-10-17 RX ORDER — HYDROMORPHONE HYDROCHLORIDE 1 MG/ML
0.2 INJECTION, SOLUTION INTRAMUSCULAR; INTRAVENOUS; SUBCUTANEOUS EVERY 5 MIN PRN
Status: DISCONTINUED | OUTPATIENT
Start: 2022-10-17 | End: 2022-10-17

## 2022-10-17 RX ORDER — CEFAZOLIN SODIUM/WATER 2 G/20 ML
2 SYRINGE (ML) INTRAVENOUS ONCE
Status: COMPLETED | OUTPATIENT
Start: 2022-10-17 | End: 2022-10-17

## 2022-10-17 RX ORDER — HEPARIN SODIUM 5000 [USP'U]/ML
5000 INJECTION, SOLUTION INTRAVENOUS; SUBCUTANEOUS ONCE
Status: COMPLETED | OUTPATIENT
Start: 2022-10-17 | End: 2022-10-17

## 2022-10-17 RX ORDER — HYDROMORPHONE HYDROCHLORIDE 1 MG/ML
0.4 INJECTION, SOLUTION INTRAMUSCULAR; INTRAVENOUS; SUBCUTANEOUS EVERY 5 MIN PRN
Status: DISCONTINUED | OUTPATIENT
Start: 2022-10-17 | End: 2022-10-17

## 2022-10-17 RX ORDER — HYDROCODONE BITARTRATE AND ACETAMINOPHEN 5; 325 MG/1; MG/1
1 TABLET ORAL ONCE AS NEEDED
Status: COMPLETED | OUTPATIENT
Start: 2022-10-17 | End: 2022-10-17

## 2022-10-17 RX ORDER — BUPIVACAINE HYDROCHLORIDE AND EPINEPHRINE 5; 5 MG/ML; UG/ML
INJECTION, SOLUTION EPIDURAL; INTRACAUDAL; PERINEURAL AS NEEDED
Status: DISCONTINUED | OUTPATIENT
Start: 2022-10-17 | End: 2022-10-17 | Stop reason: HOSPADM

## 2022-10-17 RX ORDER — LIDOCAINE HYDROCHLORIDE 10 MG/ML
INJECTION, SOLUTION INFILTRATION; PERINEURAL AS NEEDED
Status: DISCONTINUED | OUTPATIENT
Start: 2022-10-17 | End: 2022-10-17 | Stop reason: HOSPADM

## 2022-10-17 RX ORDER — LIDOCAINE HYDROCHLORIDE 10 MG/ML
INJECTION, SOLUTION EPIDURAL; INFILTRATION; INTRACAUDAL; PERINEURAL AS NEEDED
Status: DISCONTINUED | OUTPATIENT
Start: 2022-10-17 | End: 2022-10-17 | Stop reason: SURG

## 2022-10-17 RX ORDER — DIPHENHYDRAMINE HYDROCHLORIDE 50 MG/ML
12.5 INJECTION INTRAMUSCULAR; INTRAVENOUS AS NEEDED
Status: DISCONTINUED | OUTPATIENT
Start: 2022-10-17 | End: 2022-10-17

## 2022-10-17 RX ORDER — ACETAMINOPHEN 500 MG
1000 TABLET ORAL ONCE AS NEEDED
Status: COMPLETED | OUTPATIENT
Start: 2022-10-17 | End: 2022-10-17

## 2022-10-17 RX ORDER — MIDAZOLAM HYDROCHLORIDE 1 MG/ML
1 INJECTION INTRAMUSCULAR; INTRAVENOUS EVERY 5 MIN PRN
Status: DISCONTINUED | OUTPATIENT
Start: 2022-10-17 | End: 2022-10-17

## 2022-10-17 RX ADMIN — SODIUM CHLORIDE, SODIUM LACTATE, POTASSIUM CHLORIDE, CALCIUM CHLORIDE: 600; 310; 30; 20 INJECTION, SOLUTION INTRAVENOUS at 11:21:00

## 2022-10-17 RX ADMIN — LIDOCAINE HYDROCHLORIDE 50 MG: 10 INJECTION, SOLUTION EPIDURAL; INFILTRATION; INTRACAUDAL; PERINEURAL at 11:25:00

## 2022-10-17 RX ADMIN — DEXAMETHASONE SODIUM PHOSPHATE 4 MG: 4 MG/ML VIAL (ML) INJECTION at 11:32:00

## 2022-10-17 RX ADMIN — ONDANSETRON 4 MG: 2 INJECTION INTRAMUSCULAR; INTRAVENOUS at 11:32:00

## 2022-10-17 RX ADMIN — CEFAZOLIN SODIUM/WATER 2 G: 2 G/20 ML SYRINGE (ML) INTRAVENOUS at 11:30:00

## 2022-10-28 ENCOUNTER — OFFICE VISIT (OUTPATIENT)
Facility: LOCATION | Age: 62
End: 2022-10-28

## 2022-10-28 VITALS — HEART RATE: 86 BPM | TEMPERATURE: 97 F

## 2022-10-28 DIAGNOSIS — Z98.890 POST-OPERATIVE STATE: Primary | ICD-10-CM

## 2022-10-28 PROCEDURE — 99024 POSTOP FOLLOW-UP VISIT: CPT | Performed by: PHYSICIAN ASSISTANT

## 2023-10-17 ENCOUNTER — TELEPHONE (OUTPATIENT)
Dept: FAMILY MEDICINE CLINIC | Facility: CLINIC | Age: 63
End: 2023-10-17

## 2023-10-17 NOTE — TELEPHONE ENCOUNTER
Patient notified and verbalized understanding. States he does not have time to schedule, states he should have called sooner. Advised can try OTC benadryl.

## 2023-10-17 NOTE — TELEPHONE ENCOUNTER
PT CALLED AND ADV THAT HE IS FLYING OUT TOMORROW AND WOULD LIKE TO SEE IF PT ABLE TO GET SOMETHING TO CALM HIS NERVES.     PLEASE ADV    [facility]       THANK YOU

## 2023-10-17 NOTE — TELEPHONE ENCOUNTER
I don't see that he has taken anything before for anxiety with flights. I would need to see him before prescribing. He can try taking benadryl OTC as needed.

## 2024-03-22 ENCOUNTER — OFFICE VISIT (OUTPATIENT)
Dept: FAMILY MEDICINE CLINIC | Facility: CLINIC | Age: 64
End: 2024-03-22
Payer: COMMERCIAL

## 2024-03-22 VITALS
BODY MASS INDEX: 30 KG/M2 | SYSTOLIC BLOOD PRESSURE: 122 MMHG | HEART RATE: 72 BPM | WEIGHT: 217.81 LBS | OXYGEN SATURATION: 98 % | DIASTOLIC BLOOD PRESSURE: 80 MMHG | RESPIRATION RATE: 18 BRPM | TEMPERATURE: 97 F

## 2024-03-22 DIAGNOSIS — Z12.5 SCREENING FOR MALIGNANT NEOPLASM OF PROSTATE: ICD-10-CM

## 2024-03-22 DIAGNOSIS — I25.10 CAD IN NATIVE ARTERY: ICD-10-CM

## 2024-03-22 DIAGNOSIS — R73.9 HYPERGLYCEMIA: ICD-10-CM

## 2024-03-22 DIAGNOSIS — Z00.00 HEALTHY ADULT ON ROUTINE PHYSICAL EXAMINATION: Primary | ICD-10-CM

## 2024-03-22 PROBLEM — R19.7 DIARRHEA: Status: RESOLVED | Noted: 2020-07-24 | Resolved: 2024-03-22

## 2024-03-22 PROBLEM — K92.1 HEMATOCHEZIA: Status: RESOLVED | Noted: 2020-07-15 | Resolved: 2024-03-22

## 2024-03-22 LAB
ALBUMIN SERPL-MCNC: 3.9 G/DL (ref 3.4–5)
ALBUMIN/GLOB SERPL: 1.3 {RATIO} (ref 1–2)
ALP LIVER SERPL-CCNC: 67 U/L
ALT SERPL-CCNC: 32 U/L
ANION GAP SERPL CALC-SCNC: 7 MMOL/L (ref 0–18)
AST SERPL-CCNC: 16 U/L (ref 15–37)
BASOPHILS # BLD AUTO: 0.05 X10(3) UL (ref 0–0.2)
BASOPHILS NFR BLD AUTO: 0.7 %
BILIRUB SERPL-MCNC: 0.5 MG/DL (ref 0.1–2)
BUN BLD-MCNC: 18 MG/DL (ref 9–23)
CALCIUM BLD-MCNC: 8.9 MG/DL (ref 8.5–10.1)
CHLORIDE SERPL-SCNC: 110 MMOL/L (ref 98–112)
CHOLEST SERPL-MCNC: 143 MG/DL (ref ?–200)
CO2 SERPL-SCNC: 23 MMOL/L (ref 21–32)
COMPLEXED PSA SERPL-MCNC: 1.86 NG/ML (ref ?–4)
CREAT BLD-MCNC: 1.21 MG/DL
EGFRCR SERPLBLD CKD-EPI 2021: 67 ML/MIN/1.73M2 (ref 60–?)
EOSINOPHIL # BLD AUTO: 0.17 X10(3) UL (ref 0–0.7)
EOSINOPHIL NFR BLD AUTO: 2.3 %
ERYTHROCYTE [DISTWIDTH] IN BLOOD BY AUTOMATED COUNT: 13.3 %
EST. AVERAGE GLUCOSE BLD GHB EST-MCNC: 143 MG/DL (ref 68–126)
FASTING PATIENT LIPID ANSWER: YES
FASTING STATUS PATIENT QL REPORTED: YES
GLOBULIN PLAS-MCNC: 2.9 G/DL (ref 2.8–4.4)
GLUCOSE BLD-MCNC: 117 MG/DL (ref 70–99)
HBA1C MFR BLD: 6.6 % (ref ?–5.7)
HCT VFR BLD AUTO: 41.1 %
HDLC SERPL-MCNC: 46 MG/DL (ref 40–59)
HGB BLD-MCNC: 14.4 G/DL
IMM GRANULOCYTES # BLD AUTO: 0.03 X10(3) UL (ref 0–1)
IMM GRANULOCYTES NFR BLD: 0.4 %
LDLC SERPL CALC-MCNC: 72 MG/DL (ref ?–100)
LYMPHOCYTES # BLD AUTO: 1.33 X10(3) UL (ref 1–4)
LYMPHOCYTES NFR BLD AUTO: 18.3 %
MCH RBC QN AUTO: 30.7 PG (ref 26–34)
MCHC RBC AUTO-ENTMCNC: 35 G/DL (ref 31–37)
MCV RBC AUTO: 87.6 FL
MONOCYTES # BLD AUTO: 0.61 X10(3) UL (ref 0.1–1)
MONOCYTES NFR BLD AUTO: 8.4 %
NEUTROPHILS # BLD AUTO: 5.06 X10 (3) UL (ref 1.5–7.7)
NEUTROPHILS # BLD AUTO: 5.06 X10(3) UL (ref 1.5–7.7)
NEUTROPHILS NFR BLD AUTO: 69.9 %
NONHDLC SERPL-MCNC: 97 MG/DL (ref ?–130)
OSMOLALITY SERPL CALC.SUM OF ELEC: 293 MOSM/KG (ref 275–295)
PLATELET # BLD AUTO: 204 10(3)UL (ref 150–450)
POTASSIUM SERPL-SCNC: 4 MMOL/L (ref 3.5–5.1)
PROT SERPL-MCNC: 6.8 G/DL (ref 6.4–8.2)
RBC # BLD AUTO: 4.69 X10(6)UL
SODIUM SERPL-SCNC: 140 MMOL/L (ref 136–145)
TRIGL SERPL-MCNC: 145 MG/DL (ref 30–149)
VLDLC SERPL CALC-MCNC: 22 MG/DL (ref 0–30)
WBC # BLD AUTO: 7.3 X10(3) UL (ref 4–11)

## 2024-03-22 PROCEDURE — 83036 HEMOGLOBIN GLYCOSYLATED A1C: CPT | Performed by: FAMILY MEDICINE

## 2024-03-22 PROCEDURE — 80061 LIPID PANEL: CPT | Performed by: FAMILY MEDICINE

## 2024-03-22 PROCEDURE — 85025 COMPLETE CBC W/AUTO DIFF WBC: CPT | Performed by: FAMILY MEDICINE

## 2024-03-22 PROCEDURE — 80053 COMPREHEN METABOLIC PANEL: CPT | Performed by: FAMILY MEDICINE

## 2024-03-22 PROCEDURE — 99396 PREV VISIT EST AGE 40-64: CPT | Performed by: FAMILY MEDICINE

## 2024-03-22 RX ORDER — FLUOROURACIL 50 MG/G
1 CREAM TOPICAL
COMMUNITY
Start: 2023-11-28

## 2024-03-22 NOTE — PROGRESS NOTES
Yousif Díaz is a 64 year old male who presents for a complete physical exam.   HPI:   Pt complains of anxiety with flying. Wondering if he can take something. Hasn't tried benadryl, but took an advil PM one time and it helped.     Had hernia surgery in 2022. Still gets some pain. Worse if he does a lot.     Takes advil for sinuses, which helps. Takes that about 5x per week. Allergy pills don't help, they dry him out. Sinus rinses help some. Takes mucinex as needed.     Heart attack yrs ago, on metoprolol. CABGx3 in 2017. Following with Dr. Thompson.     Working from home. Planning to work for another 4 yrs.   Lives at home with wife and adult children.     Smoking marijuana or using edibles daily for recreational use.   Alcohol  3-4 drinks 2-3 times per week.     Immunization History   Administered Date(s) Administered    Covid-19 Vaccine Moderna 100 mcg/0.5 ml 03/14/2021, 04/11/2021    TD 02/04/1999    TDAP 04/27/2021     Wt Readings from Last 6 Encounters:   03/22/24 217 lb 12.8 oz (98.8 kg)   10/17/22 222 lb (100.7 kg)   04/29/22 222 lb (100.7 kg)   04/05/22 222 lb (100.7 kg)   03/25/22 222 lb (100.7 kg)   07/15/20 213 lb 9.6 oz (96.9 kg)     Body mass index is 30.38 kg/m².     Lab Results   Component Value Date    GLU 91 10/14/2022    GLU 88 07/14/2020     (H) 11/21/2017     Lab Results   Component Value Date    CHOLEST 124 11/18/2017    CHOLEST 95 (L) 11/02/2013     Lab Results   Component Value Date    HDL 52 11/18/2017    HDL 35 (L) 11/02/2013     Lab Results   Component Value Date    LDL 62 11/18/2017    LDL 44 11/02/2013     Lab Results   Component Value Date    AST 16 07/14/2020    AST 13 11/02/2013     Lab Results   Component Value Date    ALT 26 07/14/2020    ALT 16 11/02/2013     No results found for: \"PSA\"     Current Outpatient Medications   Medication Sig Dispense Refill    metoprolol succinate  MG Oral Tablet 24 Hr Take 1 tablet (100 mg total) by mouth daily.      Turmeric Does  not apply Powder Take by mouth daily.      aspirin 325 MG Oral Tab Take 81 mg by mouth daily. Takes 1/2 tab daily       atorvastatin 40 MG Oral Tab Take 1 tablet (40 mg total) by mouth daily.      HYDROcodone-acetaminophen (NORCO) 5-325 MG Oral Tab Take 1 tablet by mouth every 6 (six) hours as needed for Pain. (Patient not taking: Reported on 3/22/2024) 20 tablet 0      Past Medical History:   Diagnosis Date    Coronary atherosclerosis     Diarrhea, unspecified     Heart attack (HCC) 2002    High blood pressure     High cholesterol     History of COVID-19 09/11/2022    patient-reported pos test; states no severe resp sx; no sx as of 10/12/22 - not hospitalized     Stented coronary artery     Visual impairment     glasses      Past Surgical History:   Procedure Laterality Date    ANGIOPLASTY (CORONARY)      BYPASS SURGERY  11/18/2017    CATH DRUG ELUTING STENT  2002    2 stents inserted    CATH DRUG ELUTING STENT  2004    2 additional stents inserted    COLONOSCOPY  12/30/2013    Dr. Silverio    HERNIA SURGERY  4/22/16    Laparoscopic bilateral inguinal hernia repair with mesh and umbilical hernia repair    IR STENT      cardiac    PERIODONTAL SCALING & ROOT        Family History   Problem Relation Age of Onset    Heart Disease Father     Hypertension Father     Heart Attack Father     Hypertension Mother     Heart Disorder Paternal Uncle     Cancer Paternal Uncle         oral       Social History:  Social History     Socioeconomic History    Marital status:    Tobacco Use    Smoking status: Never    Smokeless tobacco: Never   Vaping Use    Vaping Use: Never used   Substance and Sexual Activity    Alcohol use: Yes     Alcohol/week: 2.0 standard drinks of alcohol     Types: 2 Cans of beer per week     Comment: weekly    Drug use: Yes     Frequency: 2.0 times per week     Types: Cannabis     Comment: edible and smoking      Occ: working from home. : yes. Children: grown kids.   Exercise: minimal.  Diet:  watches minimally     REVIEW OF SYSTEMS:   GENERAL: feels well otherwise  SKIN: denies any unusual skin lesions  EYES:denies blurred vision or double vision  HEENT: denies nasal congestion, sinus pain or ST  LUNGS: denies shortness of breath with exertion  CARDIOVASCULAR: denies chest pain on exertion  GI: denies abdominal pain,denies heartburn  : denies nocturia or changes in stream  MUSCULOSKELETAL: denies back pain, some soreness   NEURO: denies headaches  PSYCHE: denies depression or anxiety, feels down at times, friends have either moved away or  and he is home alone for work.   HEMATOLOGIC: denies hx of anemia  ENDOCRINE: denies thyroid history  ALL/ASTHMA: denies hx of allergy or asthma    EXAM:   /80   Pulse 72   Temp 97.3 °F (36.3 °C)   Resp 18   Wt 217 lb 12.8 oz (98.8 kg)   SpO2 98%   BMI 30.38 kg/m²   Body mass index is 30.38 kg/m².   GENERAL: well developed, well nourished,in no apparent distress  SKIN: no rashes,no suspicious lesions  HEENT: atraumatic, normocephalic,ears and throat are clear  EYES:PERRLA, EOMI, normal conjunctiva are clear  NECK: supple,no adenopathy,   CHEST: no chest tenderness  BREAST: no dominant or suspicious mass  LUNGS: clear to auscultation  CARDIO: RRR without murmur  GI: good BS's,no masses, HSM or tenderness  : not done   MUSCULOSKELETAL: back is not tender,FROM of the back  EXTREMITIES: no cyanosis, clubbing or edema  NEURO: Oriented times three,cranial nerves are intact,motor and sensory are grossly intact    ASSESSMENT AND PLAN:   Yousif Díaz is a 64 year old male who presents for a complete physical exam.  Pt's weight is Body mass index is 30.38 kg/m²., recommended low fat diet and aerobic exercise 30 minutes three times weekly. Health maintenance, will check fasting Lipids, CMP, CBC and PSA. Pt up to date with screening colonoscopy. Pt info handouts given for: exercise, low fat diet, testicular self exam and prostate cancer screening. The  patient indicates understanding of these issues and agrees to the plan.  The patient is asked to return for CPX in 1 yr or sooner if needed.     Encounter Diagnoses   Name Primary?    Healthy adult on routine physical examination Yes    Screening for malignant neoplasm of prostate     CAD in native artery    - labs as ordered.   - declined vaccines to day, will do through pharmacy.   - follow up with cardiology for heart disease.     Orders Placed This Encounter   Procedures    CBC With Differential With Platelet    Comp Metabolic Panel (14)    Lipid Panel    PSA Total, Screen    VENIPUNCTURE       Meds & Refills for this Visit:  Requested Prescriptions      No prescriptions requested or ordered in this encounter       Imaging & Consults:  None

## 2024-04-01 ENCOUNTER — TELEPHONE (OUTPATIENT)
Dept: FAMILY MEDICINE CLINIC | Facility: CLINIC | Age: 64
End: 2024-04-01

## 2024-04-01 ENCOUNTER — OFFICE VISIT (OUTPATIENT)
Dept: FAMILY MEDICINE CLINIC | Facility: CLINIC | Age: 64
End: 2024-04-01
Payer: COMMERCIAL

## 2024-04-01 VITALS
HEIGHT: 71 IN | OXYGEN SATURATION: 98 % | RESPIRATION RATE: 14 BRPM | WEIGHT: 217 LBS | TEMPERATURE: 97 F | SYSTOLIC BLOOD PRESSURE: 112 MMHG | BODY MASS INDEX: 30.38 KG/M2 | HEART RATE: 60 BPM | DIASTOLIC BLOOD PRESSURE: 82 MMHG

## 2024-04-01 DIAGNOSIS — E11.9 TYPE 2 DIABETES MELLITUS WITHOUT COMPLICATION, WITHOUT LONG-TERM CURRENT USE OF INSULIN (HCC): Primary | ICD-10-CM

## 2024-04-01 DIAGNOSIS — E11.9 TYPE 2 DIABETES MELLITUS WITHOUT COMPLICATION, WITHOUT LONG-TERM CURRENT USE OF INSULIN (HCC): ICD-10-CM

## 2024-04-01 DIAGNOSIS — E11.9 DIABETES MELLITUS, NEW ONSET (HCC): ICD-10-CM

## 2024-04-01 LAB
CREAT UR-SCNC: 52.9 MG/DL
MICROALBUMIN UR-MCNC: <0.5 MG/DL

## 2024-04-01 PROCEDURE — 99214 OFFICE O/P EST MOD 30 MIN: CPT | Performed by: FAMILY MEDICINE

## 2024-04-01 PROCEDURE — 82043 UR ALBUMIN QUANTITATIVE: CPT | Performed by: FAMILY MEDICINE

## 2024-04-01 PROCEDURE — 82570 ASSAY OF URINE CREATININE: CPT | Performed by: FAMILY MEDICINE

## 2024-04-01 RX ORDER — BLOOD-GLUCOSE METER
1 KIT MISCELLANEOUS 2 TIMES DAILY
Qty: 1 KIT | Refills: 0 | COMMUNITY
Start: 2024-04-01 | End: 2024-04-01

## 2024-04-01 RX ORDER — BLOOD-GLUCOSE METER
KIT MISCELLANEOUS
Qty: 1 KIT | Refills: 0 | Status: SHIPPED | OUTPATIENT
Start: 2024-04-01

## 2024-04-01 RX ORDER — BLOOD SUGAR DIAGNOSTIC
STRIP MISCELLANEOUS
Qty: 100 STRIP | Refills: 1 | Status: SHIPPED | OUTPATIENT
Start: 2024-04-01 | End: 2024-04-01

## 2024-04-01 RX ORDER — LANCETS 33 GAUGE
1 EACH MISCELLANEOUS 2 TIMES DAILY
Qty: 100 EACH | Refills: 1 | Status: SHIPPED | OUTPATIENT
Start: 2024-04-01 | End: 2025-04-01

## 2024-04-01 RX ORDER — BLOOD SUGAR DIAGNOSTIC
STRIP MISCELLANEOUS
Qty: 200 STRIP | Refills: 1 | Status: SHIPPED | OUTPATIENT
Start: 2024-04-01 | End: 2025-04-01

## 2024-04-01 NOTE — TELEPHONE ENCOUNTER
Fax from [facility] stating they need updated sig on test strips to state how often patient should test  Confirmed with KE, patient test bid  Sig updated for BID testing

## 2024-04-01 NOTE — PROGRESS NOTES
HPI:   Yousif Díaz is a 64 year old male who presents for recheck of his diabetes. This is a new diagnosis.   Patient’s FBS have been not checked. Last visit with ophthalmologist was not recently.  Pt has been checking his feet on a regular basis. Pt reports any tingling of the feet. Pt denies any issues with depression. Pt complains of just started cutting back on sugars. He has a sweet tooth, so that is hard for him.     Has dry mouth at night. Feet have been tingling on and off and hands tingle at night. 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       Wt Readings from Last 6 Encounters:   04/01/24 217 lb (98.4 kg)   03/22/24 217 lb 12.8 oz (98.8 kg)   10/17/22 222 lb (100.7 kg)   04/29/22 222 lb (100.7 kg)   04/05/22 222 lb (100.7 kg)   03/25/22 222 lb (100.7 kg)     Body mass index is 30.27 kg/m².     Lab Results   Component Value Date    A1C 6.6 (H) 03/22/2024    A1C 5.2 11/18/2017     Lab Results   Component Value Date    CHOLEST 143 03/22/2024    CHOLEST 124 11/18/2017    CHOLEST 95 (L) 11/02/2013     Lab Results   Component Value Date    HDL 46 03/22/2024    HDL 52 11/18/2017    HDL 35 (L) 11/02/2013     Lab Results   Component Value Date    LDL 72 03/22/2024    LDL 62 11/18/2017    LDL 44 11/02/2013     Lab Results   Component Value Date    TRIG 145 03/22/2024    TRIG 52 11/18/2017    TRIG 82 11/02/2013     Lab Results   Component Value Date    AST 16 03/22/2024    AST 16 07/14/2020    AST 13 11/02/2013     Lab Results   Component Value Date    ALT 32 03/22/2024    ALT 26 07/14/2020    ALT 16 11/02/2013     No results found for: \"MICROALBCREA\"    Current Outpatient Medications   Medication Sig Dispense Refill    fluorouracil 5 % External Cream 1 Ring every 28 days. FOR 21 DAYS IN, THEN REMOVE FOR 7 DAYS      metoprolol succinate  MG Oral Tablet 24 Hr Take 1 tablet (100 mg total) by mouth daily.      Turmeric Does not apply Powder Take by mouth daily.      aspirin 325 MG  Oral Tab Take 81 mg by mouth daily. Takes 1/2 tab daily       atorvastatin 40 MG Oral Tab Take 1 tablet (40 mg total) by mouth daily.        Past Medical History:   Diagnosis Date    Coronary atherosclerosis     Diarrhea, unspecified     Heart attack (HCC) 2002    High blood pressure     High cholesterol     History of COVID-19 09/11/2022    patient-reported pos test; states no severe resp sx; no sx as of 10/12/22 - not hospitalized     Stented coronary artery     Visual impairment     glasses      Past Surgical History:   Procedure Laterality Date    ANGIOPLASTY (CORONARY)      BYPASS SURGERY  11/18/2017    CATH DRUG ELUTING STENT  2002    2 stents inserted    CATH DRUG ELUTING STENT  2004    2 additional stents inserted    COLONOSCOPY  12/30/2013    Dr. Silverio    HERNIA SURGERY  4/22/16    Laparoscopic bilateral inguinal hernia repair with mesh and umbilical hernia repair    IR STENT      cardiac    PERIODONTAL SCALING & ROOT        Social History:   Social History     Socioeconomic History    Marital status:    Tobacco Use    Smoking status: Never    Smokeless tobacco: Never   Vaping Use    Vaping Use: Never used   Substance and Sexual Activity    Alcohol use: Yes     Alcohol/week: 2.0 standard drinks of alcohol     Types: 2 Cans of beer per week     Comment: weekly    Drug use: Yes     Frequency: 2.0 times per week     Types: Cannabis     Comment: edible and smoking     Exercise: minimal.  Diet: watches minimally     REVIEW OF SYSTEMS:   GENERAL HEALTH: feels well otherwise  SKIN: denies any unusual skin lesions or rashes  RESPIRATORY: denies shortness of breath with exertion  CARDIOVASCULAR: denies chest pain on exertion  GI: denies abdominal pain and denies heartburn  NEURO: denies headaches    EXAM:   /82   Pulse 60   Temp 97 °F (36.1 °C) (Temporal)   Resp 14   Ht 5' 11\" (1.803 m)   Wt 217 lb (98.4 kg)   SpO2 98%   BMI 30.27 kg/m²   GENERAL: well developed, well nourished,in no apparent  distress  SKIN: no rashes,no suspicious lesions  NECK: supple,no adenopathy,no bruits  LUNGS: clear to auscultation  CARDIO: RRR without murmur  GI: good BS's,no masses, HSM or tenderness  EXTREMITIES: no cyanosis, clubbing or edema  NEURO: sensation is intact to monofilament   Bilateral barefoot skin diabetic exam is normal, visualized feet and the appearance is normal.  Bilateral monofilament/sensation of both feet is normal.  Pulsation pedal pulse exam of both lower legs/feet is normal as well.      ASSESSMENT AND PLAN:   Yousif Díaz is a 64 year old male who presents for a recheck of his diabetes. Diabetic control is mildly elevated.  Recommendations are: continue present meds, check HgbA1C, check fasting lipids and CMP, lose wgt with carbohydrate controlled diet and exercise, refer to Ophthamology, check feet daily.  The patient indicates understanding of these issues and agrees to the plan.  The patient is asked to return in 3-4 months.    Encounter Diagnoses   Name Primary?    Type 2 diabetes mellitus without complication, without long-term current use of insulin (HCC) Yes    Diabetes mellitus, new onset (HCC)    - refer for DM education.   - glucose testing supplies sent.   - no meds for now, will make diet changes and exercise.   - follow up in 4-6 months.     Orders Placed This Encounter   Procedures    Microalb/Creat Ratio, Random Urine       Meds & Refills for this Visit:  Requested Prescriptions     Signed Prescriptions Disp Refills    Glucose Blood (ONETOUCH VERIO) In Vitro Strip 100 strip 1     Sig: Use as directed.    Blood Glucose Monitoring Suppl (ONETOUCH VERIO IQ SYSTEM) w/Device Does not apply Kit 1 kit 0     Si Device by Other route 2 (two) times daily. Use as directed.    OneTouch Delica Lancets 33G Does not apply Misc 100 each 1     Si Lancet by Finger stick route in the morning and 1 Lancet before bedtime.       Imaging & Consults:  OP REFERRAL DIABETES FULL ED 10 HRS  GROUP/IND

## 2024-09-15 DIAGNOSIS — E11.9 TYPE 2 DIABETES MELLITUS WITHOUT COMPLICATION, WITHOUT LONG-TERM CURRENT USE OF INSULIN (HCC): ICD-10-CM

## 2024-09-16 RX ORDER — LANCETS 33 GAUGE
EACH MISCELLANEOUS
Qty: 100 EACH | Refills: 1 | Status: SHIPPED | OUTPATIENT
Start: 2024-09-16

## 2024-09-16 NOTE — TELEPHONE ENCOUNTER
Diabetic Supplies Protocol [id number]/15/2024 07:17 AM   Protocol Details In person appointment or virtual visit in the past 12 mos or appointment in next 3 mos       LOV 4/1/24     Medication Quantity Refills Start End   OneTouch Delica Lancets 33G Does not apply Misc 100 each 1 4/1/2024 4/1/2025       No future appointments.

## 2024-12-13 ENCOUNTER — TELEPHONE (OUTPATIENT)
Dept: FAMILY MEDICINE CLINIC | Facility: CLINIC | Age: 64
End: 2024-12-13

## 2024-12-13 ENCOUNTER — MED REC SCAN ONLY (OUTPATIENT)
Dept: FAMILY MEDICINE CLINIC | Facility: CLINIC | Age: 64
End: 2024-12-13

## 2024-12-13 NOTE — TELEPHONE ENCOUNTER
Exam note received from [facility]  Exam date 12/11/24  No retinopathy noted  flowsheet updated  Report to scanning

## 2025-01-10 ENCOUNTER — OFFICE VISIT (OUTPATIENT)
Dept: FAMILY MEDICINE CLINIC | Facility: CLINIC | Age: 65
End: 2025-01-10
Payer: COMMERCIAL

## 2025-01-10 VITALS
DIASTOLIC BLOOD PRESSURE: 70 MMHG | OXYGEN SATURATION: 97 % | HEART RATE: 74 BPM | BODY MASS INDEX: 29 KG/M2 | TEMPERATURE: 97 F | WEIGHT: 211.38 LBS | RESPIRATION RATE: 18 BRPM | SYSTOLIC BLOOD PRESSURE: 130 MMHG

## 2025-01-10 DIAGNOSIS — J06.9 VIRAL UPPER RESPIRATORY TRACT INFECTION: ICD-10-CM

## 2025-01-10 DIAGNOSIS — E11.9 TYPE 2 DIABETES MELLITUS WITHOUT COMPLICATION, WITHOUT LONG-TERM CURRENT USE OF INSULIN (HCC): ICD-10-CM

## 2025-01-10 DIAGNOSIS — H65.92 LEFT NON-SUPPURATIVE OTITIS MEDIA: Primary | ICD-10-CM

## 2025-01-10 LAB
ALBUMIN SERPL-MCNC: 4.6 G/DL (ref 3.2–4.8)
ALBUMIN/GLOB SERPL: 1.6 {RATIO} (ref 1–2)
ALP LIVER SERPL-CCNC: 74 U/L
ALT SERPL-CCNC: 21 U/L
ANION GAP SERPL CALC-SCNC: 6 MMOL/L (ref 0–18)
AST SERPL-CCNC: 26 U/L (ref ?–34)
BILIRUB SERPL-MCNC: 0.6 MG/DL (ref 0.2–1.1)
BUN BLD-MCNC: 19 MG/DL (ref 9–23)
CALCIUM BLD-MCNC: 9.8 MG/DL (ref 8.7–10.4)
CHLORIDE SERPL-SCNC: 109 MMOL/L (ref 98–112)
CHOLEST SERPL-MCNC: 125 MG/DL (ref ?–200)
CO2 SERPL-SCNC: 23 MMOL/L (ref 21–32)
CREAT BLD-MCNC: 1.15 MG/DL
CREAT UR-SCNC: 140.2 MG/DL
EGFRCR SERPLBLD CKD-EPI 2021: 71 ML/MIN/1.73M2 (ref 60–?)
EST. AVERAGE GLUCOSE BLD GHB EST-MCNC: 108 MG/DL (ref 68–126)
FASTING PATIENT LIPID ANSWER: NO
FASTING STATUS PATIENT QL REPORTED: NO
GLOBULIN PLAS-MCNC: 2.8 G/DL (ref 2–3.5)
GLUCOSE BLD-MCNC: 117 MG/DL (ref 70–99)
HBA1C MFR BLD: 5.4 % (ref ?–5.7)
HDLC SERPL-MCNC: 36 MG/DL (ref 40–59)
LDLC SERPL CALC-MCNC: 75 MG/DL (ref ?–100)
MICROALBUMIN UR-MCNC: 0.4 MG/DL
MICROALBUMIN/CREAT 24H UR-RTO: 2.9 UG/MG (ref ?–30)
NONHDLC SERPL-MCNC: 89 MG/DL (ref ?–130)
OSMOLALITY SERPL CALC.SUM OF ELEC: 289 MOSM/KG (ref 275–295)
POTASSIUM SERPL-SCNC: 4.3 MMOL/L (ref 3.5–5.1)
PROT SERPL-MCNC: 7.4 G/DL (ref 5.7–8.2)
SODIUM SERPL-SCNC: 138 MMOL/L (ref 136–145)
TRIGL SERPL-MCNC: 70 MG/DL (ref 30–149)
VLDLC SERPL CALC-MCNC: 11 MG/DL (ref 0–30)

## 2025-01-10 PROCEDURE — 99214 OFFICE O/P EST MOD 30 MIN: CPT | Performed by: FAMILY MEDICINE

## 2025-01-10 PROCEDURE — 82043 UR ALBUMIN QUANTITATIVE: CPT | Performed by: FAMILY MEDICINE

## 2025-01-10 PROCEDURE — 80053 COMPREHEN METABOLIC PANEL: CPT | Performed by: FAMILY MEDICINE

## 2025-01-10 PROCEDURE — 82570 ASSAY OF URINE CREATININE: CPT | Performed by: FAMILY MEDICINE

## 2025-01-10 PROCEDURE — 83036 HEMOGLOBIN GLYCOSYLATED A1C: CPT | Performed by: FAMILY MEDICINE

## 2025-01-10 PROCEDURE — 80061 LIPID PANEL: CPT | Performed by: FAMILY MEDICINE

## 2025-01-10 NOTE — PROGRESS NOTES
Yousif Díaz is a 64 year old male.  Chief Complaint   Patient presents with    Upper Respiratory Infection     Couple weeks    Sinus Problem     yesterday       HPI:   Started end of last money. A lot of nasal/sinus drainage. PND is bad, causing coughing.   Ears starting to hurt.   Not too much sinus pain.   Some chest congestion. No SOB or tightness.   Sunday thought it was better, but then got worse again.     DM: not taking meds.   Checking sugars. Fasting they will be 105 or less. Certain foods, like potatoes, will spike it. Every time he exercises, sugars are great.   Cutting back on sweets.     ALLERGIES:  Allergies[1]      Current Outpatient Medications   Medication Sig Dispense Refill    amoxicillin clavulanate 875-125 MG Oral Tab Take 1 tablet by mouth 2 (two) times daily for 10 days. 20 tablet 0    Lancets (ONETOUCH DELICA PLUS [id number]) Does not apply Misc USE TO TEST ONCE IN THE MORNING AND ONCE BEFORE BEDTIME. 100 each 1    Blood Glucose Monitoring Suppl (ONETOUCH VERIO IQ SYSTEM) w/Device Does not apply Kit Use to test blood sugar twice daily as directed. 1 kit 0    Glucose Blood (ONETOUCH VERIO) In Vitro Strip Use to check blood sugar twice daily as directed. 200 strip 1    metoprolol succinate  MG Oral Tablet 24 Hr Take 1 tablet (100 mg total) by mouth daily.      Turmeric Does not apply Powder Take by mouth daily.      atorvastatin 40 MG Oral Tab Take 1 tablet (40 mg total) by mouth daily.      fluorouracil 5 % External Cream 1 Ring every 28 days. FOR 21 DAYS IN, THEN REMOVE FOR 7 DAYS (Patient not taking: Reported on 1/10/2025)      aspirin 325 MG Oral Tab Take 81 mg by mouth daily. Takes 1/2 tab daily  (Patient not taking: Reported on 1/10/2025)        Past Medical History:    Coronary atherosclerosis    Diarrhea, unspecified    Heart attack (HCC)    High blood pressure    High cholesterol    History of COVID-19    patient-reported pos test; states no severe resp sx; no sx as of  10/12/22 - not hospitalized     Stented coronary artery    Visual impairment    glasses      Social History:  Social History     Socioeconomic History    Marital status:    Tobacco Use    Smoking status: Never    Smokeless tobacco: Never   Vaping Use    Vaping status: Never Used   Substance and Sexual Activity    Alcohol use: Yes     Alcohol/week: 2.0 standard drinks of alcohol     Types: 2 Cans of beer per week     Comment: weekly    Drug use: Yes     Frequency: 2.0 times per week     Types: Cannabis     Comment: edible and smoking     Social Drivers of Health     Food Insecurity: No Food Insecurity (1/10/2025)    NCSS - Food Insecurity     Worried About Running Out of Food in the Last Year: No     Ran Out of Food in the Last Year: No   Transportation Needs: No Transportation Needs (1/10/2025)    NCSS - Transportation     Lack of Transportation: No   Physical Activity: Inactive (9/9/2020)    [facility]    Exercise Vital Sign     Days of Exercise per Week: 0 days     Minutes of Exercise per Session: 0 min    [facility]    Social Connections   Housing Stability: Not At Risk (1/10/2025)    NCSS - Housing/Utilities     Has Housing: Yes     Worried About Losing Housing: No     Unable to Get Utilities: No        BP Readings from Last 6 Encounters:   01/10/25 130/70   04/01/24 112/82   03/22/24 122/80   10/17/22 134/86   04/29/22 123/84   04/05/22 113/79       Wt Readings from Last 6 Encounters:   01/10/25 211 lb 6.4 oz (95.9 kg)   04/01/24 217 lb (98.4 kg)   03/22/24 217 lb 12.8 oz (98.8 kg)   10/17/22 222 lb (100.7 kg)   04/29/22 222 lb (100.7 kg)   04/05/22 222 lb (100.7 kg)       REVIEW OF SYSTEMS:   GENERAL HEALTH: feels well no complaints other than above   SKIN: denies any unusual skin lesions or rashes  RESPIRATORY: denies shortness of breath with exertion, see HPI   CARDIOVASCULAR: denies  chest pain on exertion  GI: denies abdominal pain and denies heartburn  NEURO: denies headaches    EXAM:   /70   Pulse 74   Temp 97.4 °F (36.3 °C) (Temporal)   Resp 18   Wt 211 lb 6.4 oz (95.9 kg)   SpO2 97%   BMI 29.48 kg/m²  Body mass index is 29.48 kg/m².      GENERAL: well developed, well nourished,in no apparent distress  SKIN: no rashes,no suspicious lesions  HEENT: atraumatic, normocephalic, left ear with fluid and bulging, mild erythema, right wnl, no sinus tenderness   NECK: supple,no adenopathy  LUNGS: clear to auscultation, no rales or wheezing, but coarse wet coughing   CARDIO: RRR without murmur  GI: good BS's,no masses, HSM or tenderness  EXTREMITIES: no cyanosis, clubbing or edema    ASSESSMENT AND PLAN:     Encounter Diagnoses   Name Primary?    Left non-suppurative otitis media Yes    Viral upper respiratory tract infection     Type 2 diabetes mellitus without complication, without long-term current use of insulin ([facility])        Diagnoses and all orders for this visit:    Left non-suppurative otitis media  -     amoxicillin clavulanate 875-125 MG Oral Tab; Take 1 tablet by mouth 2 (two) times daily for 10 days.  Treat ear infection.   RTC if worsening or not better.     Viral upper respiratory tract infection    Type 2 diabetes mellitus without complication, without long-term current use of insulin (HCC)  -     Hemoglobin A1C; Future  -     Microalb/Creat Ratio, Random Urine; Future  -     VENIPUNCTURE  -     Lipid Panel; Future  -     Comp Metabolic Panel (14) [E]; Future    Check labs as ordered for DM.   Follow up for physical in 3 months.     Orders Placed This Encounter   Procedures    Hemoglobin A1C     Standing Status:   Future     Number of Occurrences:   1     Standing Expiration Date:   1/11/2026    Microalb/Creat Ratio, Random Urine     Standing Status:   Future     Number of Occurrences:   1     Standing Expiration Date:   1/10/2026    Lipid Panel     Standing Status:   Future      Number of Occurrences:   1     Standing Expiration Date:   1/10/2026     Order Specific Question:   [facility]: Has the patient fasted?     Answer:   Yes    Comp Metabolic Panel (14) [E]     Standing Status:   Future     Number of Occurrences:   1     Standing Expiration Date:   1/10/2026     Order Specific Question:   [facility]: Has the patient fasted?     Answer:   Yes     Order Specific Question:   Release to patient     Answer:   Immediate    VENIPUNCTURE     Order Specific Question:   Release to patient     Answer:   Immediate               Meds & Refills for this Visit:  Requested Prescriptions     Signed Prescriptions Disp Refills    amoxicillin clavulanate 875-125 MG Oral Tab 20 tablet 0     Sig: Take 1 tablet by mouth 2 (two) times daily for 10 days.             The patient indicates understanding of these issues and agrees to the plan.               [1]   Allergies  Allergen Reactions    Lisinopril DIZZINESS

## 2025-03-27 ENCOUNTER — TELEPHONE (OUTPATIENT)
Dept: FAMILY MEDICINE CLINIC | Facility: CLINIC | Age: 65
End: 2025-03-27

## (undated) DIAGNOSIS — Z11.59 ENCOUNTER FOR SCREENING FOR OTHER VIRAL DISEASES: Primary | ICD-10-CM

## (undated) DIAGNOSIS — Z01.818 PREOP EXAMINATION: ICD-10-CM

## (undated) DEVICE — GLOVE SURG TRIUMPH SZ 8

## (undated) DEVICE — HEMOCLIP HORIZON LG 004200

## (undated) DEVICE — SUT VICRYL 3-0 CT-2 J232H

## (undated) DEVICE — BREAST-HERNIA-PORT CDS-LF: Brand: MEDLINE INDUSTRIES, INC.

## (undated) DEVICE — TIBURON DRAPE TOWELS: Brand: CONVERTORS

## (undated) DEVICE — SUTURE POLYDEK 2-0

## (undated) DEVICE — SPNG GZ W4XL4IN COT 12 PLY TYP

## (undated) DEVICE — TRANSPOSAL ULTRAFLEX DUO/QUAD ULTRA CART MANIFOLD

## (undated) DEVICE — BLADE STERNAL SAW BULK PACK

## (undated) DEVICE — STERILE POLYISOPRENE POWDER-FREE SURGICAL GLOVES: Brand: PROTEXIS

## (undated) DEVICE — SUT ETHIBOND 0 MO-7 CX41D

## (undated) DEVICE — SOLUTION  .9 1000ML BTL

## (undated) DEVICE — STRL PENROSE DRAIN 18" X 1/4": Brand: CARDINAL HEALTH

## (undated) DEVICE — TRAY ENDOVEIN KTV16 HARVESTING

## (undated) DEVICE — DRAPE SLUSH/WARMER W/DISC

## (undated) DEVICE — CELL SAVER BAG 600ML 4R2023

## (undated) DEVICE — SOL LACT RINGERS 1000ML

## (undated) DEVICE — SLEEVE KENDALL SCD EXPRESS MED

## (undated) DEVICE — HEX-LOCKING BLADE ELECTRODE: Brand: EDGE

## (undated) DEVICE — GAUZE SPONGES,12 PLY: Brand: CURITY

## (undated) DEVICE — CELL SAVER RESERVOIR BRAT

## (undated) DEVICE — UNDYED BRAIDED (POLYGLACTIN 910), SYNTHETIC ABSORBABLE SUTURE: Brand: COATED VICRYL

## (undated) DEVICE — BLOWER CO2 MEDTRONIC/DLP 22150

## (undated) DEVICE — SPONGE: LAP 18X18 W XR 200/CS: Brand: MEDICAL ACTION INDUSTRIES

## (undated) DEVICE — 3M™ BAIR HUGGER® CARDIAC ACCESS BLANKET, 5 PER CASE 63000: Brand: BAIR HUGGER™

## (undated) DEVICE — SUT VICRYL 3-0 J110T

## (undated) DEVICE — APPLICATOR CHLORAPREP 26ML

## (undated) DEVICE — CELL SAVER 5/5+ BOWL KIT-225ML: Brand: HAEMONETICS CELL SAVER 5/5+ SYSTEMS

## (undated) DEVICE — VIOLET BRAIDED (POLYGLACTIN 910), SYNTHETIC ABSORBABLE SUTURE: Brand: COATED VICRYL

## (undated) DEVICE — Device

## (undated) DEVICE — 3M(TM) MICROPORE TAPE DISPENSER 1535-2: Brand: 3M™ MICROPORE™

## (undated) DEVICE — FIXED CORE WIRE GUIDE SAFE-T-J, CURVED: Brand: COOK

## (undated) DEVICE — PDS II VLT 0 107CM AG ST3: Brand: ENDOLOOP

## (undated) DEVICE — INDICATED FOR SURGICAL CLAMPING DURING CARDIOVASCULAR PERIPHERAL VASCULAR, AND GENERAL SURGERY.: Brand: SOFT/FIBRA® SPRING CLIP

## (undated) DEVICE — OPEN HEART: Brand: MEDLINE INDUSTRIES, INC.

## (undated) DEVICE — SUTURE SILK 2-0

## (undated) NOTE — LETTER
22    Patient: Roxie Romero  : 1960 Visit date: 2022    Dear  Bhupendra Youssef DO    Thank you for referring Roxie Romero to my practice. Please find my assessment and plan below. History of Present Illness    Today, I am seeing this patient for follow up-Hugo is here today to discuss results of bilateral inguinal ultrasound. Genna Willis did have previous laparoscopic bilateral inguinal herniorrhaphy performed by Dr. Garrison Mcintyre in 2016. Bilateral inguinal ultrasound reveals a recurrent left inguinal hernia  We did discuss treatment options and the recommendation would be to proceed  With open left inguinal herniorrhaphy with mesh plug. 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   Cheryl Murry may move up his surgery if any symptoms develop    Assessment   Unilateral recurrent inguinal hernia without obstruction or gangrene  (primary encounter diagnosis)    Plan     Open left inguinal herniorrhaphy with mesh plug for recurrent left inguinal hernia status post laparoscopic repair   Surgery will tentatively be scheduled for 2022 as patient is an avid golfwer    Thank you for allowing me to participate in your patient's care         Sincerely,       Filiberto Mcleod MD   CC: No Recipients

## (undated) NOTE — LETTER
22    Patient: Dejah Taylor  : 1960 Visit date: 2022    Dear  Anthony Wick DO    Thank you for referring Dejah Taylor to my practice. Please find my assessment and plan below. History of Present Illness   Constance Wells is a 58-year-old male presenting to clinic for evaluation of bilateral groin pain. The patient underwent laparoscopic bilateral inguinal hernia repair with mesh in  by Dr. Tera Lowery. He states over the over the past 6 months he has noticed increased bilateral groin pain following moving large concrete blocks. The patient states that he was moving heavy bricks weighing approximately 90 pounds in his yard. His pain has failed to resolve and he noticed a small bulge to both groins bilaterally. The patient has past medical history of coronary artery disease and NSTEMI. He underwent a CABG with drug-eluting stent in . The patient takes an 81 mg aspirin daily. The patient has family history of cardiac disease and hypertension. Patient denies tobacco, alcohol, or drug abuse  On physical examination there does appear to be a moderate-sized recurrent right inguinal hernia.   There is some laxity in the left inguinal region as well which may also represent a recurrence    Bilateral inguinal ultrasound will be ordered to confirm recurrent hernias    Assessment   Bilateral groin pain  (primary encounter diagnosis)      Plan   The patient is to undergo bilateral groin ultrasound and follow-up in clinic afterwards        Sincerely,       Radha Heath MD   CC: No Recipients

## (undated) NOTE — LETTER
OUTSIDE TESTING RESULT REQUEST     IMPORTANT: FOR YOUR IMMEDIATE ATTENTION      Please FAX all test results listed below to: [phone]       * * * * If testing is NOT complete, arrange with patient A.S.A.P. * * * *      Patient Name: Fiorella Brumfield  Surgery Date: 10/17/2022  CSN: [id number]  Medical Record: [id number]   : 1960 - A: 58 y      Sex: male  Surgeon(s):  Irish Meigs, MD  Procedure: LEFT INGUINAL HERNIA REPAIR WITH MESH PLUG   Anesthesia Type: General     Surgeon: Irish Meigs, MD     The following Testing and Time Line are REQUIRED PER ANESTHESIA     EKG READ AND SIGNED WITHIN   90 days  BMP (requires 4 hour fast) within  90 days      Thank You,   Sent by:SURESH Kessler - Pre-Admission Testing

## (undated) NOTE — LETTER
Marielena Patterson M.D., F.A.C.S. Deyanira Campo M.D., F.A.C.S. Carol Givens M.D., Luis Elkins. LEEANNA Booth M.D., F.A.C.SRadha Mosley. Nichol Bright M.D., F.A.C.S. NNAMDI Marion M. Otelia Skill A.D. Ardelia Keels, M.D., FRadha chance to have all of your questions and concerns answered. If there are any issues which have not been adequately addressed, we ask you to bring them forward so that we can thoroughly address them.     A patient who is fully informed and understands their treatment options:    Yes _____ No _____    A [facility] surgeon as explained to me that if I should so desire, he/she is willing to explain my case and the surgical and non-surgical options to family members:             Yes _____ No _____    A [facility] surgeon has an

## (undated) NOTE — LETTER
Patient Name: Patrice Hansen CSN: [id number]  -Age / Sex: 1960-A: 58 y  male Medical Records: [id number]    The above patient had a positive COVID test on: 22. Per [facility] Infection Control guidelines this patient will NOT be retested for COVID  prior to their surgery/procedure on: 10/17/2022. Thank you.